# Patient Record
Sex: MALE | Race: WHITE | NOT HISPANIC OR LATINO | Employment: FULL TIME | URBAN - NONMETROPOLITAN AREA
[De-identification: names, ages, dates, MRNs, and addresses within clinical notes are randomized per-mention and may not be internally consistent; named-entity substitution may affect disease eponyms.]

---

## 2017-02-08 ENCOUNTER — HOSPITAL ENCOUNTER (EMERGENCY)
Facility: HOSPITAL | Age: 22
Discharge: HOME | End: 2017-02-08
Attending: FAMILY MEDICINE

## 2017-02-08 VITALS
HEART RATE: 83 BPM | WEIGHT: 188 LBS | OXYGEN SATURATION: 100 % | TEMPERATURE: 97.6 F | RESPIRATION RATE: 18 BRPM | BODY MASS INDEX: 25.47 KG/M2 | SYSTOLIC BLOOD PRESSURE: 124 MMHG | DIASTOLIC BLOOD PRESSURE: 76 MMHG | HEIGHT: 72 IN

## 2017-02-08 DIAGNOSIS — F43.21 ADJUSTMENT DISORDER WITH DEPRESSED MOOD: Primary | ICD-10-CM

## 2017-02-08 LAB
AMPHET+METHAMPHET UR QL: POSITIVE
BARBITURATES UR QL SCN: NEGATIVE
BENZODIAZ UR QL SCN: NEGATIVE
BILIRUB UR QL STRIP: NEGATIVE
CANNABINOIDS SERPL QL: NEGATIVE
CLARITY UR: CLEAR
COCAINE UR QL: NEGATIVE
COLOR UR: YELLOW
ETHANOL BLD-MCNC: <10 MG/DL (ref 0–0)
ETHANOL UR QL: <0.01 %
GLUCOSE UR STRIP-MCNC: NEGATIVE MG/DL
HGB UR QL STRIP.AUTO: NEGATIVE
HOLD SPECIMEN: NORMAL
HOLD SPECIMEN: NORMAL
KETONES UR QL STRIP: NEGATIVE
LEUKOCYTE ESTERASE UR QL STRIP.AUTO: NEGATIVE
METHADONE UR QL SCN: NEGATIVE
NITRITE UR QL STRIP: NEGATIVE
OPIATES UR QL: NEGATIVE
OXYCODONE UR QL SCN: NEGATIVE
PH UR STRIP.AUTO: <=5 [PH] (ref 5–9)
PROT UR QL STRIP: NEGATIVE
SP GR UR STRIP: >1.035 (ref 1–1.03)
UROBILINOGEN UR QL STRIP: ABNORMAL
WHOLE BLOOD HOLD SPECIMEN: NORMAL
WHOLE BLOOD HOLD SPECIMEN: NORMAL

## 2017-02-08 PROCEDURE — 80307 DRUG TEST PRSMV CHEM ANLYZR: CPT | Performed by: FAMILY MEDICINE

## 2017-02-08 PROCEDURE — 81003 URINALYSIS AUTO W/O SCOPE: CPT | Performed by: FAMILY MEDICINE

## 2017-02-08 PROCEDURE — 99283 EMERGENCY DEPT VISIT LOW MDM: CPT

## 2017-02-08 NOTE — ED PROVIDER NOTES
Subjective   HPI Comments: Pt presents for psych evaluation as he made comments to end his life to his girlfriend who called police and made him come here.   Pt denies any suicidal thoughts and says that comment was made out of stressful routine of life.     Patient is a 21 y.o. male presenting with mental health disorder.   History provided by:  Patient  Mental Health Problem   Presenting symptoms: depression and suicidal threats    Patient accompanied by:  Caregiver  Degree of incapacity: none   Onset quality:  Sudden  Timing:  Constant  Progression:  Worsening  Chronicity:  New  Context: not alcohol use, not drug abuse, not medication, not noncompliant and not recent medication change    Treatment compliance:  All of the time  Relieved by:  Nothing  Ineffective treatments:  None tried  Associated symptoms: fatigue    Associated symptoms: no abdominal pain, no anhedonia, no anxiety, no appetite change, no chest pain, no decreased need for sleep, not distractible, no euphoric mood, no feelings of worthlessness, no headaches, no hypersomnia, no hyperventilation, no insomnia, no irritability and no poor judgment        Review of Systems   Constitutional: Positive for fatigue. Negative for appetite change and irritability.   Cardiovascular: Negative for chest pain.   Gastrointestinal: Negative for abdominal pain.   Neurological: Negative for headaches.   Psychiatric/Behavioral: The patient is not nervous/anxious and does not have insomnia.        Past Medical History   Diagnosis Date   • Attention deficit hyperactivity disorder    • Elevated blood pressure reading      without diagnosis of hypertension   • Encounter for screening for infections with a predominantly sexual mode of transmission    • Fatigue    • Generalized anxiety disorder    • Sinus headache    • Sore throat        Allergies   Allergen Reactions   • Biaxin [Clarithromycin]    • Cefzil [Cefprozil]    • Penicillins        History reviewed. No pertinent  "past surgical history.    History reviewed. No pertinent family history.    Social History     Social History   • Marital status: Single     Spouse name: N/A   • Number of children: N/A   • Years of education: N/A     Social History Main Topics   • Smoking status: Never Smoker   • Smokeless tobacco: Never Used   • Alcohol use No   • Drug use: None   • Sexual activity: Not Asked     Other Topics Concern   • None     Social History Narrative           Objective    Visit Vitals   • /76 (Patient Position: Lying)   • Pulse 83   • Temp 97.6 °F (36.4 °C)   • Resp 18   • Ht 72\" (182.9 cm)   • Wt 188 lb (85.3 kg)   • SpO2 100%   • BMI 25.5 kg/m2         Physical Exam   Constitutional: He is oriented to person, place, and time. He appears well-developed and well-nourished.   HENT:   Head: Normocephalic and atraumatic.   Right Ear: External ear normal.   Left Ear: External ear normal.   Nose: Nose normal.   Mouth/Throat: Oropharynx is clear and moist.   Eyes: Conjunctivae and EOM are normal. Pupils are equal, round, and reactive to light.   Neck: Normal range of motion. Neck supple.   Cardiovascular: Normal rate, regular rhythm, normal heart sounds and intact distal pulses.    Pulmonary/Chest: Effort normal and breath sounds normal. No accessory muscle usage. No respiratory distress. He exhibits no tenderness and no deformity.   Abdominal: Soft. Bowel sounds are normal. There is no tenderness.   Musculoskeletal: Normal range of motion.   Neurological: He is alert and oriented to person, place, and time. He has normal reflexes.   Skin: Skin is warm.   Psychiatric: He has a normal mood and affect. His behavior is normal. Judgment and thought content normal.   Nursing note and vitals reviewed.      Procedures         ED Course  ED Course      Labs Reviewed   ETHANOL - Abnormal; Notable for the following:        Result Value    Ethanol <10 (*)     All other components within normal limits   URINE DRUG SCREEN - Abnormal; " Notable for the following:     Amphetamine Screen, Urine Positive (*)     All other components within normal limits    Narrative:     Negative Thresholds For Drugs Screened in Urine:     Amphetamines          500 ng/ml  Barbiturates          200 ng/ml  Benzodiazepines       200 ng/ml  Cocaine               150 ng/ml  Methadone             300 ng/mL  Opiates               300 ng/mL  Oxycodone             100 ng/mL  THC                   20 ng/mL    The normal value for all drugs tested is negative. This report includes final unconfirmed screening results.  A positive result by this assay can be, at your request, sent to the Reference Lab for confirmation by gas chromatography. Unconfirmed results must not be used for non-medical purposes, such as employment or legal testing. Clinical consideration should be applied to any drug of abuse test result, particularly when unconfirmed results are used.   URINALYSIS W/ CULTURE IF INDICATED - Abnormal; Notable for the following:     Specific Gravity, UA >1.035 (*)     All other components within normal limits    Narrative:     Urine microscopic not indicated.   RAINBOW DRAW    Narrative:     The following orders were created for panel order [facility] Draw.  Procedure                               Abnormality         Status                     ---------                               -----------         ------                     Light Blue Top[[id number]]                                    Final result               Green Top (Gel)[[id number]]                                   Final result               Lavender Top[[id number]]                                      Final result               Gold Top - SST[[id number]]                                    Final result                 Please view results for these tests on the individual orders.   LIGHT BLUE TOP   GREEN TOP   LAVENDER TOP   GOLD TOP - SST     No results found.    Pt is medically cleared and seen and evaluated by [facility] staff .  Who agrees pt is not suicidal and made that comment angrily. Pt has mother bedside and who assures pt's safety .   Pt is explained to see psych facility outpt for his depression for further evaluation .           MDM  Number of Diagnoses or Management Options  Adjustment disorder with depressed mood:       Final diagnoses:   Adjustment disorder with depressed mood            Olivia Sanon MD  02/10/17 5722

## 2017-02-17 ENCOUNTER — HOSPITAL ENCOUNTER (OUTPATIENT)
Facility: HOSPITAL | Age: 22
Discharge: HOME | End: 2017-02-18
Attending: EMERGENCY MEDICINE | Admitting: SURGERY

## 2017-02-17 ENCOUNTER — APPOINTMENT (OUTPATIENT)
Dept: CT IMAGING | Facility: HOSPITAL | Age: 22
End: 2017-02-17

## 2017-02-17 ENCOUNTER — ANESTHESIA (OUTPATIENT)
Dept: PERIOP | Facility: HOSPITAL | Age: 22
End: 2017-02-17

## 2017-02-17 ENCOUNTER — TELEPHONE (OUTPATIENT)
Dept: FAMILY MEDICINE CLINIC | Facility: CLINIC | Age: 22
End: 2017-02-17

## 2017-02-17 ENCOUNTER — ANESTHESIA EVENT (OUTPATIENT)
Dept: PERIOP | Facility: HOSPITAL | Age: 22
End: 2017-02-17

## 2017-02-17 DIAGNOSIS — K35.80 ACUTE APPENDICITIS, UNSPECIFIED ACUTE APPENDICITIS TYPE: Primary | ICD-10-CM

## 2017-02-17 PROBLEM — K37 APPENDICITIS: Status: ACTIVE | Noted: 2017-02-17

## 2017-02-17 LAB
ALBUMIN SERPL-MCNC: 4.1 G/DL (ref 3.4–4.8)
ALBUMIN/GLOB SERPL: 1.4 G/DL (ref 1.1–1.8)
ALP SERPL-CCNC: 40 U/L (ref 65–260)
ALT SERPL W P-5'-P-CCNC: 25 U/L (ref 21–72)
ANION GAP SERPL CALCULATED.3IONS-SCNC: 11 MMOL/L (ref 5–15)
AST SERPL-CCNC: 27 U/L (ref 17–59)
BASOPHILS # BLD AUTO: 0.01 10*3/MM3 (ref 0–0.2)
BASOPHILS NFR BLD AUTO: 0.1 % (ref 0–2)
BILIRUB SERPL-MCNC: 0.7 MG/DL (ref 0.2–1.3)
BILIRUB UR QL STRIP: NEGATIVE
BUN BLD-MCNC: 11 MG/DL (ref 7–21)
BUN/CREAT SERPL: 13.9 (ref 7–25)
CALCIUM SPEC-SCNC: 9.2 MG/DL (ref 8.4–10.2)
CHLORIDE SERPL-SCNC: 101 MMOL/L (ref 95–110)
CLARITY UR: CLEAR
CO2 SERPL-SCNC: 30 MMOL/L (ref 22–31)
COLOR UR: YELLOW
CREAT BLD-MCNC: 0.79 MG/DL (ref 0.7–1.3)
DEPRECATED RDW RBC AUTO: 43.3 FL (ref 35.1–43.9)
EOSINOPHIL # BLD AUTO: 0.12 10*3/MM3 (ref 0–0.7)
EOSINOPHIL NFR BLD AUTO: 1.3 % (ref 0–7)
ERYTHROCYTE [DISTWIDTH] IN BLOOD BY AUTOMATED COUNT: 14.1 % (ref 11.5–14.5)
GFR SERPL CREATININE-BSD FRML MDRD: 126 ML/MIN/1.73 (ref 77–179)
GLOBULIN UR ELPH-MCNC: 2.9 GM/DL (ref 2.3–3.5)
GLUCOSE BLD-MCNC: 91 MG/DL (ref 60–100)
GLUCOSE BLDC GLUCOMTR-MCNC: 230 MG/DL (ref 70–130)
GLUCOSE UR STRIP-MCNC: NEGATIVE MG/DL
HCT VFR BLD AUTO: 41.8 % (ref 39–49)
HGB BLD-MCNC: 14.1 G/DL (ref 13.7–17.3)
HGB UR QL STRIP.AUTO: NEGATIVE
HOLD SPECIMEN: NORMAL
HOLD SPECIMEN: NORMAL
IMM GRANULOCYTES # BLD: 0.01 10*3/MM3 (ref 0–0.02)
IMM GRANULOCYTES NFR BLD: 0.1 % (ref 0–0.5)
KETONES UR QL STRIP: NEGATIVE
LEUKOCYTE ESTERASE UR QL STRIP.AUTO: NEGATIVE
LIPASE SERPL-CCNC: 63 U/L (ref 25–110)
LYMPHOCYTES # BLD AUTO: 2.54 10*3/MM3 (ref 0.6–4.2)
LYMPHOCYTES NFR BLD AUTO: 27.5 % (ref 10–50)
MCH RBC QN AUTO: 28.4 PG (ref 26.5–34)
MCHC RBC AUTO-ENTMCNC: 33.7 G/DL (ref 31.5–36.3)
MCV RBC AUTO: 84.1 FL (ref 80–98)
MONOCYTES # BLD AUTO: 0.78 10*3/MM3 (ref 0–0.9)
MONOCYTES NFR BLD AUTO: 8.4 % (ref 0–12)
NEUTROPHILS # BLD AUTO: 5.78 10*3/MM3 (ref 2–8.6)
NEUTROPHILS NFR BLD AUTO: 62.6 % (ref 37–80)
NITRITE UR QL STRIP: NEGATIVE
PH UR STRIP.AUTO: 5.5 [PH] (ref 5–9)
PLATELET # BLD AUTO: 240 10*3/MM3 (ref 150–450)
PMV BLD AUTO: 9.4 FL (ref 8–12)
POTASSIUM BLD-SCNC: 3.8 MMOL/L (ref 3.5–5.1)
PROT SERPL-MCNC: 7 G/DL (ref 6.3–8.6)
PROT UR QL STRIP: NEGATIVE
RBC # BLD AUTO: 4.97 10*6/MM3 (ref 4.37–5.74)
SODIUM BLD-SCNC: 142 MMOL/L (ref 137–145)
SP GR UR STRIP: 1.03 (ref 1–1.03)
UROBILINOGEN UR QL STRIP: NORMAL
WBC NRBC COR # BLD: 9.24 10*3/MM3 (ref 3.2–9.8)
WHOLE BLOOD HOLD SPECIMEN: NORMAL
WHOLE BLOOD HOLD SPECIMEN: NORMAL

## 2017-02-17 PROCEDURE — 99220 PR INITIAL OBSERVATION CARE/DAY 70 MINUTES: CPT | Performed by: SURGERY

## 2017-02-17 PROCEDURE — 83690 ASSAY OF LIPASE: CPT | Performed by: NURSE PRACTITIONER

## 2017-02-17 PROCEDURE — 74176 CT ABD & PELVIS W/O CONTRAST: CPT

## 2017-02-17 PROCEDURE — 96374 THER/PROPH/DIAG INJ IV PUSH: CPT

## 2017-02-17 PROCEDURE — 88302 TISSUE EXAM BY PATHOLOGIST: CPT | Performed by: SURGERY

## 2017-02-17 PROCEDURE — 94760 N-INVAS EAR/PLS OXIMETRY 1: CPT

## 2017-02-17 PROCEDURE — 81003 URINALYSIS AUTO W/O SCOPE: CPT | Performed by: NURSE PRACTITIONER

## 2017-02-17 PROCEDURE — 85025 COMPLETE CBC W/AUTO DIFF WBC: CPT | Performed by: NURSE PRACTITIONER

## 2017-02-17 PROCEDURE — 25010000002 METOCLOPRAMIDE PER 10 MG: Performed by: NURSE ANESTHETIST, CERTIFIED REGISTERED

## 2017-02-17 PROCEDURE — 25010000002 KETOROLAC TROMETHAMINE PER 15 MG: Performed by: NURSE PRACTITIONER

## 2017-02-17 PROCEDURE — 96375 TX/PRO/DX INJ NEW DRUG ADDON: CPT

## 2017-02-17 PROCEDURE — 25010000002 MIDAZOLAM PER 1 MG: Performed by: NURSE ANESTHETIST, CERTIFIED REGISTERED

## 2017-02-17 PROCEDURE — 25010000002 MEPERIDINE 25 MG/0.5ML SOLUTION: Performed by: NURSE ANESTHETIST, CERTIFIED REGISTERED

## 2017-02-17 PROCEDURE — 25010000002 ONDANSETRON PER 1 MG: Performed by: NURSE PRACTITIONER

## 2017-02-17 PROCEDURE — 25010000002 LEVOFLOXACIN PER 250 MG: Performed by: SURGERY

## 2017-02-17 PROCEDURE — 80053 COMPREHEN METABOLIC PANEL: CPT | Performed by: NURSE PRACTITIONER

## 2017-02-17 PROCEDURE — 94799 UNLISTED PULMONARY SVC/PX: CPT

## 2017-02-17 PROCEDURE — 25010000002 FENTANYL CITRATE (PF) 100 MCG/2ML SOLUTION: Performed by: NURSE ANESTHETIST, CERTIFIED REGISTERED

## 2017-02-17 PROCEDURE — 25010000002 PROPOFOL 10 MG/ML EMULSION: Performed by: NURSE ANESTHETIST, CERTIFIED REGISTERED

## 2017-02-17 PROCEDURE — 88302 TISSUE EXAM BY PATHOLOGIST: CPT | Performed by: PATHOLOGY

## 2017-02-17 PROCEDURE — 82962 GLUCOSE BLOOD TEST: CPT

## 2017-02-17 PROCEDURE — 25010000002 NEOSTIGMINE 10 MG/10ML SOLUTION

## 2017-02-17 PROCEDURE — 99284 EMERGENCY DEPT VISIT MOD MDM: CPT

## 2017-02-17 PROCEDURE — 44970 LAPAROSCOPY APPENDECTOMY: CPT | Performed by: SURGERY

## 2017-02-17 PROCEDURE — 25010000002 NEOSTIGMINE PER 0.5 MG: Performed by: NURSE ANESTHETIST, CERTIFIED REGISTERED

## 2017-02-17 RX ORDER — PROMETHAZINE HYDROCHLORIDE 25 MG/ML
12.5 INJECTION, SOLUTION INTRAMUSCULAR; INTRAVENOUS EVERY 4 HOURS PRN
Status: DISCONTINUED | OUTPATIENT
Start: 2017-02-17 | End: 2017-02-18 | Stop reason: HOSPADM

## 2017-02-17 RX ORDER — ACETAMINOPHEN 325 MG/1
650 TABLET ORAL ONCE AS NEEDED
Status: DISCONTINUED | OUTPATIENT
Start: 2017-02-17 | End: 2017-02-17 | Stop reason: HOSPADM

## 2017-02-17 RX ORDER — MORPHINE SULFATE 2 MG/ML
2 INJECTION, SOLUTION INTRAMUSCULAR; INTRAVENOUS
Status: DISCONTINUED | OUTPATIENT
Start: 2017-02-17 | End: 2017-02-17 | Stop reason: HOSPADM

## 2017-02-17 RX ORDER — NALOXONE HCL 0.4 MG/ML
0.1 VIAL (ML) INJECTION
Status: DISCONTINUED | OUTPATIENT
Start: 2017-02-17 | End: 2017-02-18 | Stop reason: HOSPADM

## 2017-02-17 RX ORDER — SODIUM CHLORIDE 9 MG/ML
125 INJECTION, SOLUTION INTRAVENOUS CONTINUOUS
Status: DISCONTINUED | OUTPATIENT
Start: 2017-02-17 | End: 2017-02-18 | Stop reason: HOSPADM

## 2017-02-17 RX ORDER — LEVOFLOXACIN 5 MG/ML
500 INJECTION, SOLUTION INTRAVENOUS EVERY 24 HOURS
Status: DISCONTINUED | OUTPATIENT
Start: 2017-02-18 | End: 2017-02-18 | Stop reason: HOSPADM

## 2017-02-17 RX ORDER — METOCLOPRAMIDE HYDROCHLORIDE 5 MG/ML
INJECTION INTRAMUSCULAR; INTRAVENOUS AS NEEDED
Status: DISCONTINUED | OUTPATIENT
Start: 2017-02-17 | End: 2017-02-17 | Stop reason: SURG

## 2017-02-17 RX ORDER — ONDANSETRON 2 MG/ML
4 INJECTION INTRAMUSCULAR; INTRAVENOUS EVERY 6 HOURS PRN
Status: DISCONTINUED | OUTPATIENT
Start: 2017-02-17 | End: 2017-02-18 | Stop reason: HOSPADM

## 2017-02-17 RX ORDER — ACETAMINOPHEN 650 MG/1
650 SUPPOSITORY RECTAL ONCE AS NEEDED
Status: DISCONTINUED | OUTPATIENT
Start: 2017-02-17 | End: 2017-02-17 | Stop reason: HOSPADM

## 2017-02-17 RX ORDER — DIPHENHYDRAMINE HYDROCHLORIDE 50 MG/ML
12.5 INJECTION INTRAMUSCULAR; INTRAVENOUS
Status: DISCONTINUED | OUTPATIENT
Start: 2017-02-17 | End: 2017-02-17 | Stop reason: HOSPADM

## 2017-02-17 RX ORDER — PROPOFOL 10 MG/ML
VIAL (ML) INTRAVENOUS AS NEEDED
Status: DISCONTINUED | OUTPATIENT
Start: 2017-02-17 | End: 2017-02-17 | Stop reason: SURG

## 2017-02-17 RX ORDER — SODIUM CHLORIDE, SODIUM GLUCONATE, SODIUM ACETATE, POTASSIUM CHLORIDE, AND MAGNESIUM CHLORIDE 526; 502; 368; 37; 30 MG/100ML; MG/100ML; MG/100ML; MG/100ML; MG/100ML
INJECTION, SOLUTION INTRAVENOUS CONTINUOUS PRN
Status: DISCONTINUED | OUTPATIENT
Start: 2017-02-17 | End: 2017-02-17 | Stop reason: SURG

## 2017-02-17 RX ORDER — PROMETHAZINE HYDROCHLORIDE 25 MG/ML
12.5 INJECTION, SOLUTION INTRAMUSCULAR; INTRAVENOUS EVERY 6 HOURS PRN
Status: DISCONTINUED | OUTPATIENT
Start: 2017-02-17 | End: 2017-02-18 | Stop reason: HOSPADM

## 2017-02-17 RX ORDER — DOCUSATE SODIUM 100 MG/1
100 CAPSULE, LIQUID FILLED ORAL 2 TIMES DAILY PRN
Status: DISCONTINUED | OUTPATIENT
Start: 2017-02-17 | End: 2017-02-18 | Stop reason: HOSPADM

## 2017-02-17 RX ORDER — LIDOCAINE HYDROCHLORIDE 20 MG/ML
INJECTION, SOLUTION INFILTRATION; PERINEURAL AS NEEDED
Status: DISCONTINUED | OUTPATIENT
Start: 2017-02-17 | End: 2017-02-17 | Stop reason: SURG

## 2017-02-17 RX ORDER — FLUMAZENIL 0.1 MG/ML
0.2 INJECTION INTRAVENOUS AS NEEDED
Status: DISCONTINUED | OUTPATIENT
Start: 2017-02-17 | End: 2017-02-17 | Stop reason: HOSPADM

## 2017-02-17 RX ORDER — DEXAMETHASONE SODIUM PHOSPHATE 4 MG/ML
8 INJECTION, SOLUTION INTRA-ARTICULAR; INTRALESIONAL; INTRAMUSCULAR; INTRAVENOUS; SOFT TISSUE ONCE AS NEEDED
Status: DISCONTINUED | OUTPATIENT
Start: 2017-02-17 | End: 2017-02-17 | Stop reason: HOSPADM

## 2017-02-17 RX ORDER — ROCURONIUM BROMIDE 10 MG/ML
INJECTION, SOLUTION INTRAVENOUS AS NEEDED
Status: DISCONTINUED | OUTPATIENT
Start: 2017-02-17 | End: 2017-02-17 | Stop reason: SURG

## 2017-02-17 RX ORDER — ONDANSETRON 4 MG/1
4 TABLET, ORALLY DISINTEGRATING ORAL EVERY 6 HOURS PRN
Status: DISCONTINUED | OUTPATIENT
Start: 2017-02-17 | End: 2017-02-18 | Stop reason: HOSPADM

## 2017-02-17 RX ORDER — ONDANSETRON 2 MG/ML
4 INJECTION INTRAMUSCULAR; INTRAVENOUS ONCE AS NEEDED
Status: DISCONTINUED | OUTPATIENT
Start: 2017-02-17 | End: 2017-02-17 | Stop reason: HOSPADM

## 2017-02-17 RX ORDER — GLYCOPYRROLATE 0.2 MG/ML
INJECTION INTRAMUSCULAR; INTRAVENOUS AS NEEDED
Status: DISCONTINUED | OUTPATIENT
Start: 2017-02-17 | End: 2017-02-17 | Stop reason: SURG

## 2017-02-17 RX ORDER — ONDANSETRON 4 MG/1
4 TABLET, FILM COATED ORAL EVERY 6 HOURS PRN
Status: DISCONTINUED | OUTPATIENT
Start: 2017-02-17 | End: 2017-02-18 | Stop reason: HOSPADM

## 2017-02-17 RX ORDER — LEVOFLOXACIN 5 MG/ML
500 INJECTION, SOLUTION INTRAVENOUS
Status: COMPLETED | OUTPATIENT
Start: 2017-02-17 | End: 2017-02-17

## 2017-02-17 RX ORDER — SODIUM CHLORIDE, SODIUM GLUCONATE, SODIUM ACETATE, POTASSIUM CHLORIDE, AND MAGNESIUM CHLORIDE 526; 502; 368; 37; 30 MG/100ML; MG/100ML; MG/100ML; MG/100ML; MG/100ML
100 INJECTION, SOLUTION INTRAVENOUS CONTINUOUS
Status: DISCONTINUED | OUTPATIENT
Start: 2017-02-17 | End: 2017-02-18 | Stop reason: HOSPADM

## 2017-02-17 RX ORDER — BUPIVACAINE HYDROCHLORIDE AND EPINEPHRINE 2.5; 5 MG/ML; UG/ML
INJECTION, SOLUTION EPIDURAL; INFILTRATION; INTRACAUDAL; PERINEURAL
Status: DISPENSED
Start: 2017-02-17 | End: 2017-02-18

## 2017-02-17 RX ORDER — HYDRALAZINE HYDROCHLORIDE 20 MG/ML
5 INJECTION INTRAMUSCULAR; INTRAVENOUS
Status: DISCONTINUED | OUTPATIENT
Start: 2017-02-17 | End: 2017-02-17 | Stop reason: HOSPADM

## 2017-02-17 RX ORDER — METOCLOPRAMIDE HYDROCHLORIDE 5 MG/ML
10 INJECTION INTRAMUSCULAR; INTRAVENOUS ONCE AS NEEDED
Status: DISCONTINUED | OUTPATIENT
Start: 2017-02-17 | End: 2017-02-17 | Stop reason: HOSPADM

## 2017-02-17 RX ORDER — CETIRIZINE HYDROCHLORIDE 5 MG/1
5 TABLET ORAL DAILY
Status: DISCONTINUED | OUTPATIENT
Start: 2017-02-17 | End: 2017-02-18 | Stop reason: HOSPADM

## 2017-02-17 RX ORDER — HYDROCODONE BITARTRATE AND ACETAMINOPHEN 5; 325 MG/1; MG/1
1 TABLET ORAL EVERY 4 HOURS PRN
Status: DISCONTINUED | OUTPATIENT
Start: 2017-02-17 | End: 2017-02-18 | Stop reason: HOSPADM

## 2017-02-17 RX ORDER — FENTANYL CITRATE 50 UG/ML
INJECTION, SOLUTION INTRAMUSCULAR; INTRAVENOUS AS NEEDED
Status: DISCONTINUED | OUTPATIENT
Start: 2017-02-17 | End: 2017-02-17 | Stop reason: SURG

## 2017-02-17 RX ORDER — MIDAZOLAM HYDROCHLORIDE 1 MG/ML
INJECTION INTRAMUSCULAR; INTRAVENOUS AS NEEDED
Status: DISCONTINUED | OUTPATIENT
Start: 2017-02-17 | End: 2017-02-17 | Stop reason: SURG

## 2017-02-17 RX ORDER — ONDANSETRON 2 MG/ML
4 INJECTION INTRAMUSCULAR; INTRAVENOUS ONCE
Status: COMPLETED | OUTPATIENT
Start: 2017-02-17 | End: 2017-02-17

## 2017-02-17 RX ORDER — SODIUM CHLORIDE 0.9 % (FLUSH) 0.9 %
10 SYRINGE (ML) INJECTION AS NEEDED
Status: DISCONTINUED | OUTPATIENT
Start: 2017-02-17 | End: 2017-02-17 | Stop reason: HOSPADM

## 2017-02-17 RX ORDER — BUPIVACAINE HYDROCHLORIDE AND EPINEPHRINE 5; 5 MG/ML; UG/ML
INJECTION, SOLUTION EPIDURAL; INTRACAUDAL; PERINEURAL AS NEEDED
Status: DISCONTINUED | OUTPATIENT
Start: 2017-02-17 | End: 2017-02-18 | Stop reason: HOSPADM

## 2017-02-17 RX ORDER — LABETALOL HYDROCHLORIDE 5 MG/ML
5 INJECTION, SOLUTION INTRAVENOUS
Status: DISCONTINUED | OUTPATIENT
Start: 2017-02-17 | End: 2017-02-17 | Stop reason: HOSPADM

## 2017-02-17 RX ORDER — KETOROLAC TROMETHAMINE 30 MG/ML
30 INJECTION, SOLUTION INTRAMUSCULAR; INTRAVENOUS ONCE
Status: COMPLETED | OUTPATIENT
Start: 2017-02-17 | End: 2017-02-17

## 2017-02-17 RX ORDER — NALOXONE HCL 0.4 MG/ML
0.2 VIAL (ML) INJECTION AS NEEDED
Status: DISCONTINUED | OUTPATIENT
Start: 2017-02-17 | End: 2017-02-17 | Stop reason: HOSPADM

## 2017-02-17 RX ORDER — ACETAMINOPHEN 325 MG/1
650 TABLET ORAL EVERY 4 HOURS PRN
Status: DISCONTINUED | OUTPATIENT
Start: 2017-02-17 | End: 2017-02-18 | Stop reason: HOSPADM

## 2017-02-17 RX ADMIN — FENTANYL CITRATE 50 MCG: 50 INJECTION, SOLUTION INTRAMUSCULAR; INTRAVENOUS at 13:19

## 2017-02-17 RX ADMIN — METRONIDAZOLE 500 MG: 500 INJECTION, SOLUTION INTRAVENOUS at 13:11

## 2017-02-17 RX ADMIN — FENTANYL CITRATE 50 MCG: 50 INJECTION, SOLUTION INTRAMUSCULAR; INTRAVENOUS at 12:55

## 2017-02-17 RX ADMIN — FENTANYL CITRATE 100 MCG: 50 INJECTION, SOLUTION INTRAMUSCULAR; INTRAVENOUS at 13:05

## 2017-02-17 RX ADMIN — SODIUM CHLORIDE, SODIUM GLUCONATE, SODIUM ACETATE, POTASSIUM CHLORIDE, AND MAGNESIUM CHLORIDE 100 ML/HR: 526; 502; 368; 37; 30 INJECTION, SOLUTION INTRAVENOUS at 14:20

## 2017-02-17 RX ADMIN — MIDAZOLAM 2 MG: 1 INJECTION INTRAMUSCULAR; INTRAVENOUS at 12:48

## 2017-02-17 RX ADMIN — LIDOCAINE HYDROCHLORIDE 60 MG: 20 INJECTION, SOLUTION INFILTRATION; PERINEURAL at 12:55

## 2017-02-17 RX ADMIN — NEOSTIGMINE METHYLSULFATE 3 MG: 1 INJECTION, SOLUTION INTRAMUSCULAR; INTRAVENOUS; SUBCUTANEOUS at 13:39

## 2017-02-17 RX ADMIN — MEPERIDINE HYDROCHLORIDE 12.5 MG: 50 INJECTION, SOLUTION INTRAMUSCULAR; INTRAVENOUS; SUBCUTANEOUS at 13:57

## 2017-02-17 RX ADMIN — SODIUM CHLORIDE, SODIUM GLUCONATE, SODIUM ACETATE, POTASSIUM CHLORIDE, AND MAGNESIUM CHLORIDE: 526; 502; 368; 37; 30 INJECTION, SOLUTION INTRAVENOUS at 12:48

## 2017-02-17 RX ADMIN — MEPERIDINE HYDROCHLORIDE 12.5 MG: 50 INJECTION, SOLUTION INTRAMUSCULAR; INTRAVENOUS; SUBCUTANEOUS at 14:23

## 2017-02-17 RX ADMIN — ONDANSETRON 4 MG: 2 INJECTION INTRAMUSCULAR; INTRAVENOUS at 11:05

## 2017-02-17 RX ADMIN — ROCURONIUM BROMIDE 25 MG: 10 INJECTION INTRAVENOUS at 13:16

## 2017-02-17 RX ADMIN — LEVOFLOXACIN 500 MG: 5 INJECTION, SOLUTION INTRAVENOUS at 13:14

## 2017-02-17 RX ADMIN — KETOROLAC TROMETHAMINE 30 MG: 30 INJECTION, SOLUTION INTRAMUSCULAR; INTRAVENOUS at 11:06

## 2017-02-17 RX ADMIN — ROCURONIUM BROMIDE 5 MG: 10 INJECTION INTRAVENOUS at 12:49

## 2017-02-17 RX ADMIN — PROPOFOL 120 MG: 10 INJECTION, EMULSION INTRAVENOUS at 12:55

## 2017-02-17 RX ADMIN — METOCLOPRAMIDE 10 MG: 5 INJECTION, SOLUTION INTRAMUSCULAR; INTRAVENOUS at 12:46

## 2017-02-17 RX ADMIN — SODIUM CHLORIDE 125 ML/HR: 900 INJECTION, SOLUTION INTRAVENOUS at 16:57

## 2017-02-17 RX ADMIN — GLYCOPYRROLATE 0.6 MG: 0.2 INJECTION, SOLUTION INTRAMUSCULAR; INTRAVENOUS at 13:39

## 2017-02-17 RX ADMIN — Medication 10 ML: at 11:06

## 2017-02-17 RX ADMIN — CETIRIZINE HYDROCHLORIDE 5 MG: 5 TABLET, FILM COATED ORAL at 17:44

## 2017-02-17 RX ADMIN — HYDROCODONE BITARTRATE AND ACETAMINOPHEN 1 TABLET: 5; 325 TABLET ORAL at 17:07

## 2017-02-17 RX ADMIN — METRONIDAZOLE 500 MG: 500 INJECTION, SOLUTION INTRAVENOUS at 17:45

## 2017-02-17 RX ADMIN — SODIUM CITRATE AND CITRIC ACID MONOHYDRATE 30 ML: 500; 334 SOLUTION ORAL at 12:46

## 2017-02-17 NOTE — ED PROVIDER NOTES
Subjective   Patient is a 21 y.o. male presenting with abdominal pain.   History provided by:  Patient  Abdominal Pain   Pain location:  RLQ  Pain quality: aching    Pain radiates to:  R flank  Pain severity:  Moderate  Onset quality:  Sudden  Progression:  Waxing and waning  Chronicity:  New      Review of Systems   Constitutional: Negative.    Eyes: Negative.    Respiratory: Negative.    Cardiovascular: Negative.    Gastrointestinal: Positive for abdominal pain.   Genitourinary: Positive for flank pain. Negative for difficulty urinating.   Skin: Negative.    Neurological: Negative.    Psychiatric/Behavioral: Negative.        Past Medical History   Diagnosis Date   • Attention deficit hyperactivity disorder    • Elevated blood pressure reading      without diagnosis of hypertension   • Encounter for screening for infections with a predominantly sexual mode of transmission    • Fatigue    • Generalized anxiety disorder    • Sinus headache    • Sore throat        Allergies   Allergen Reactions   • Biaxin [Clarithromycin]    • Cefzil [Cefprozil]    • Penicillins        Past Surgical History   Procedure Laterality Date   • External ear surgery         History reviewed. No pertinent family history.    Social History     Social History   • Marital status: Single     Spouse name: N/A   • Number of children: N/A   • Years of education: N/A     Social History Main Topics   • Smoking status: Never Smoker   • Smokeless tobacco: Never Used   • Alcohol use No   • Drug use: No   • Sexual activity: Not Asked     Other Topics Concern   • None     Social History Narrative   • None           Objective   Physical Exam   Constitutional: He is oriented to person, place, and time. He appears well-developed and well-nourished.   HENT:   Head: Normocephalic.   Eyes: EOM are normal. Pupils are equal, round, and reactive to light.   Neck: Normal range of motion. Neck supple.   Cardiovascular: Normal rate.    Pulmonary/Chest: Effort normal  "and breath sounds normal.   Abdominal: Soft. Bowel sounds are normal. He exhibits no distension. There is tenderness. There is rebound.   Tenderness over R CVA   Neurological: He is alert and oriented to person, place, and time.   Skin: Skin is warm and dry.   Nursing note and vitals reviewed.    Visit Vitals   • /65 (BP Location: Right arm, Patient Position: Lying)   • Pulse 88   • Temp 98.2 °F (36.8 °C) (Oral)   • Resp 16   • Ht 72\" (182.9 cm)   • Wt 188 lb (85.3 kg)   • SpO2 99%   • BMI 25.5 kg/m2         Procedures         ED Course  ED Course      Results for orders placed or performed during the hospital encounter of 02/17/17   Comprehensive Metabolic Panel   Result Value Ref Range    Glucose 91 60 - 100 mg/dL    BUN 11 7 - 21 mg/dL    Creatinine 0.79 0.70 - 1.30 mg/dL    Sodium 142 137 - 145 mmol/L    Potassium 3.8 3.5 - 5.1 mmol/L    Chloride 101 95 - 110 mmol/L    CO2 30.0 22.0 - 31.0 mmol/L    Calcium 9.2 8.4 - 10.2 mg/dL    Total Protein 7.0 6.3 - 8.6 g/dL    Albumin 4.10 3.40 - 4.80 g/dL    ALT (SGPT) 25 21 - 72 U/L    AST (SGOT) 27 17 - 59 U/L    Alkaline Phosphatase 40 (L) 65 - 260 U/L    Total Bilirubin 0.7 0.2 - 1.3 mg/dL    eGFR Non  Amer 126 77 - 179 mL/min/1.73    Globulin 2.9 2.3 - 3.5 gm/dL    A/G Ratio 1.4 1.1 - 1.8 g/dL    BUN/Creatinine Ratio 13.9 7.0 - 25.0    Anion Gap 11.0 5.0 - 15.0 mmol/L   Lipase   Result Value Ref Range    Lipase 63 25 - 110 U/L   Urinalysis With / Culture If Indicated   Result Value Ref Range    Color, UA Yellow Yellow, Straw, Dark Yellow, Vale    Appearance, UA Clear Clear    pH, UA 5.5 5.0 - 9.0    Specific Gravity, UA 1.029 1.003 - 1.030    Glucose, UA Negative Negative    Ketones, UA Negative Negative    Bilirubin, UA Negative Negative    Blood, UA Negative Negative    Protein, UA Negative Negative    Leuk Esterase, UA Negative Negative    Nitrite, UA Negative Negative    Urobilinogen, UA 0.2 E.U./dL 0.2 - 1.0 E.U./dL   CBC Auto Differential   Result " Value Ref Range    WBC 9.24 3.20 - 9.80 10*3/mm3    RBC 4.97 4.37 - 5.74 10*6/mm3    Hemoglobin 14.1 13.7 - 17.3 g/dL    Hematocrit 41.8 39.0 - 49.0 %    MCV 84.1 80.0 - 98.0 fL    MCH 28.4 26.5 - 34.0 pg    MCHC 33.7 31.5 - 36.3 g/dL    RDW 14.1 11.5 - 14.5 %    RDW-SD 43.3 35.1 - 43.9 fl    MPV 9.4 8.0 - 12.0 fL    Platelets 240 150 - 450 10*3/mm3    Neutrophil % 62.6 37.0 - 80.0 %    Lymphocyte % 27.5 10.0 - 50.0 %    Monocyte % 8.4 0.0 - 12.0 %    Eosinophil % 1.3 0.0 - 7.0 %    Basophil % 0.1 0.0 - 2.0 %    Immature Grans % 0.1 0.0 - 0.5 %    Neutrophils, Absolute 5.78 2.00 - 8.60 10*3/mm3    Lymphocytes, Absolute 2.54 0.60 - 4.20 10*3/mm3    Monocytes, Absolute 0.78 0.00 - 0.90 10*3/mm3    Eosinophils, Absolute 0.12 0.00 - 0.70 10*3/mm3    Basophils, Absolute 0.01 0.00 - 0.20 10*3/mm3    Immature Grans, Absolute 0.01 0.00 - 0.02 10*3/mm3     CT Abdomen Pelvis Without Contrast   Final Result   CONCLUSION: Dilated appendix with adjacent right lower quadrant   inflammatory changes. These findings are diagnostic for acute   appendicitis. No free air or abscess.       These findings discussed with the Marissa Danielson in the emergency   department at 11:31 AM.      Electronically signed by:  Glenn Javier MD  2/17/2017 11:32 AM CST   Workstation: [id number]                    [facility]  Number of Diagnoses or Management Options  Acute appendicitis, unspecified acute appendicitis type:   Diagnosis management comments: D/W Dr John, he has acute appendicitis. He will be seeing pt in ER. Dr John will be taking him to OR.      Final diagnoses:   Acute appendicitis, unspecified acute appendicitis type            Meron Danielson, APRN  02/17/17 9450

## 2017-02-17 NOTE — PLAN OF CARE
Problem: Patient Care Overview (Adult)  Goal: Plan of Care Review  Outcome: Ongoing (interventions implemented as appropriate)    02/17/17 1528   Coping/Psychosocial Response Interventions   Plan Of Care Reviewed With patient   Patient Care Overview   Progress improving   Outcome Evaluation   Outcome Summary/Follow up Plan vss. no distress noted. denies acute pain. states he feels better. ready for dc from PACU.       Goal: Adult Individualization and Mutuality  Outcome: Ongoing (interventions implemented as appropriate)    Problem: Perioperative Period (Adult)  Goal: Signs and Symptoms of Listed Potential Problems Will be Absent or Manageable (Perioperative Period)  Outcome: Ongoing (interventions implemented as appropriate)

## 2017-02-17 NOTE — ANESTHESIA POSTPROCEDURE EVALUATION
Patient: Sean De Souza    Procedure Summary     Date Anesthesia Start Anesthesia Stop Room / Location    02/17/17 1249 1469 [facility] OR 03 / [facility]       Procedure Diagnosis Surgeon Provider    APPENDECTOMY LAPAROSCOPIC  (N/A Abdomen) Acute appendicitis, unspecified acute appendicitis type  (Acute appendicitis, unspecified acute appendicitis type [K35.80]) MD Miguel Mena MD          Anesthesia Type: general  Last vitals  BP      Temp      Pulse     Resp      SpO2        Post Anesthesia Care and Evaluation    Patient location during evaluation: PACU  Patient participation: complete - patient participated  Level of consciousness: awake and alert  Pain management: adequate  Airway patency: patent  Anesthetic complications: No anesthetic complications    Cardiovascular status: acceptable  Respiratory status: acceptable  Hydration status: acceptable

## 2017-02-17 NOTE — H&P
CHIEF COMPLAINT:    Abdominal pain, RLQ    HISTORY OF PRESENT ILLNESS:    Sean De Souza is a 21 y.o. male who has had one day of dull RLQ abdominal pain which began yesterday morning.  The pain has gotten more intense and sharper.  It is associated with loss of appetite and one episode of N/V.  He denies fevers or chills.  There is no radiation of the pain.    He was evaluated in the ER where labs were essentially normal.  A CT showed a dilated inflamed appendix consistent with acute appendicitis.    Past Medical History   Diagnosis Date   • Attention deficit hyperactivity disorder    • Elevated blood pressure reading      without diagnosis of hypertension   • Encounter for screening for infections with a predominantly sexual mode of transmission    • Fatigue    • Generalized anxiety disorder    • Sinus headache    • Sore throat        Past Surgical History   Procedure Laterality Date   • External ear surgery           Current Facility-Administered Medications:   •  sodium chloride 0.9 % flush 10 mL, 10 mL, Intravenous, PRN, Meron Danielson, APRN, 10 mL at 02/17/17 1106    Current Outpatient Prescriptions:   •  amphetamine-dextroamphetamine (ADDERALL) 20 MG tablet, Take 1 tablet by mouth Daily., Disp: 30 tablet, Rfl: 0  •  amphetamine-dextroamphetamine (ADDERALL) 5 MG tablet, Take 1 tablet by mouth Daily., Disp: 30 tablet, Rfl: 0  •  minocycline (MINOCIN,DYNACIN) 50 MG capsule, Take 50 mg by mouth 2 (Two) Times a Day., Disp: , Rfl:   •  hydrocortisone 2.5 % lotion, Apply  topically 2 (Two) Times a Day. Apply to affected area, Disp: , Rfl:   •  loratadine (CLARITIN) 10 MG tablet, Take 10 mg by mouth Daily., Disp: , Rfl:   •  sertraline (ZOLOFT) 50 MG tablet, TAKE 1/2 TABLET BY MOUTH ONCE DAILY FOR 6 DAYS, THEN 1 TABLET ONCE DAILY THEREAFTER., Disp: , Rfl:     Allergies   Allergen Reactions   • Biaxin [Clarithromycin]    • Cefzil [Cefprozil]    • Penicillins        History reviewed. No pertinent family  "history.    Social History     Social History   • Marital status: Single     Spouse name: N/A   • Number of children: N/A   • Years of education: N/A     Occupational History   • Not on file.     Social History Main Topics   • Smoking status: Never Smoker   • Smokeless tobacco: Never Used   • Alcohol use No   • Drug use: No   • Sexual activity: Not on file     Other Topics Concern   • Not on file     Social History Narrative   • No narrative on file       Review of Systems   Constitutional: Positive for appetite change. Negative for activity change, chills and fever.   HENT: Negative for hearing loss, nosebleeds and trouble swallowing.    Cardiovascular: Negative for chest pain, palpitations and leg swelling.   Gastrointestinal: Positive for abdominal pain, nausea and vomiting. Negative for abdominal distention, anal bleeding, blood in stool, constipation, diarrhea and rectal pain.   Endocrine: Negative for cold intolerance, heat intolerance, polydipsia and polyuria.   Genitourinary: Negative for decreased urine volume, difficulty urinating, dysuria, enuresis, frequency, hematuria and urgency.   Musculoskeletal: Negative for arthralgias, back pain, gait problem, myalgias and neck pain.   Skin: Negative for pallor, rash and wound.   Allergic/Immunologic: Negative for immunocompromised state.   Neurological: Negative for dizziness, seizures, weakness, light-headedness, numbness and headaches.   Psychiatric/Behavioral: Negative for agitation and behavioral problems. The patient is not nervous/anxious.        Objective     Visit Vitals   • /65 (BP Location: Right arm, Patient Position: Lying)   • Pulse 88   • Temp 98.2 °F (36.8 °C) (Oral)   • Resp 16   • Ht 72\" (182.9 cm)   • Wt 188 lb (85.3 kg)   • SpO2 99%   • BMI 25.5 kg/m2       Physical Exam   Constitutional: He is oriented to person, place, and time. He appears well-developed and well-nourished.   HENT:   Head: Normocephalic and atraumatic.   Nose: Nose " normal.   Eyes: Conjunctivae and EOM are normal. Right eye exhibits no discharge. Left eye exhibits no discharge.   Neck: Trachea normal, normal range of motion and phonation normal. Neck supple. No JVD present. No tracheal deviation and no edema present. No thyromegaly present.   Cardiovascular: Normal rate, regular rhythm and normal heart sounds.  Exam reveals no gallop and no friction rub.    No murmur heard.  Pulmonary/Chest: Effort normal and breath sounds normal. No accessory muscle usage. No respiratory distress. He has no decreased breath sounds. He has no wheezes. He has no rales. He exhibits no tenderness.   Abdominal: Soft. He exhibits no distension, no fluid wave, no ascites, no pulsatile midline mass and no mass. There is tenderness. There is rebound and guarding. No hernia.   RLQ pain/tenderness   Musculoskeletal: Normal range of motion. He exhibits no edema, tenderness or deformity.   Lymphadenopathy:     He has no cervical adenopathy.        Left: No supraclavicular adenopathy present.   Neurological: He is alert and oriented to person, place, and time. He has normal strength. No cranial nerve deficit.   Skin: Skin is warm and dry. No rash noted. He is not diaphoretic. No erythema. No pallor.   Psychiatric: He has a normal mood and affect. His speech is normal and behavior is normal. Judgment and thought content normal. Cognition and memory are normal.   Vitals reviewed.      DIAGNOSTIC DATA:    Labs reviewed, essentially normal.  CT reviewed showing dilated appendix.    ASSESSMENT:    Acute Appendicitis    PLAN:    Risks and benefits of laparoscopic appendectomy, possible open discussed with the patient and his mother.  They wish to proceed.  Will start IVF and ABx now.          This document has been electronically signed by Jamil John MD on February 17, 2017 11:57 AM

## 2017-02-17 NOTE — ANESTHESIA PROCEDURE NOTES
Airway  Urgency: elective    Airway not difficult    General Information and Staff    CRNA: AR MENDES    Indications and Patient Condition  Indications for airway management: airway protection    Preoxygenated: yes  Mask difficulty assessment: 0 - not attempted    Final Airway Details  Final airway type: endotracheal airway      Successful airway: ETT  Cuffed: yes   Successful intubation technique: video laryngoscopy  Facilitating devices/methods: cricoid pressure  Endotracheal tube insertion site: oral  Blade: Tracy  Blade size: #3  ETT size: 7.0 mm  Cormack-Lehane Classification: grade I - full view of glottis  Placement verified by: chest auscultation and capnometry   Measured from: lips  ETT to lips (cm): 22  Number of attempts at approach: 1

## 2017-02-17 NOTE — TELEPHONE ENCOUNTER
----- Message from Bridger Bee MD sent at 2/17/2017  1:10 PM CST -----  Regarding: RE: PRESPS?  Contact: [phone]   Needs apt for adhd medication  ----- Message -----     From: Sav Haynes MA     Sent: 2/17/2017   9:03 AM       To: Bridger Bee MD  Subject: FW: PRESPS?                                      Pt's last appt was 10/05/2016. No f/u appt was ever made. No future appts scheduled.      Please advise on ADHD medication.    Thank you  ----- Message -----     From: Dionne Muniz     Sent: 2/17/2017   8:04 AM       To: Sav Haynes MA  Subject: PRESPS?                                          MOTHER OF FRANCISCA JUDD CALLED FIRST WANTING TO GET HIM WORKED IN WITH DR BEE..THINKS HE MAY HAVE THE FLU..TOLD HER THAT WOULD NOT BE POSSIBLE UNLESS WE GOT A CANCELATION..SHE COULD TAKE HIM TO CC...THEN SHE CALLS AGAIN ASKING TO GET HIM IN FOR HIS ADHD REFILLS..SO I TOLD HER WE WOULD SEND A MESSAGE ABOUT REFILLS....PLEASE CALL HER BACK ABOUT PRESPS

## 2017-02-17 NOTE — ANESTHESIA PREPROCEDURE EVALUATION
Anesthesia Evaluation     Patient summary reviewed and Nursing notes reviewed   NPO Status: waived due to emergency   Airway   Mallampati: II  Dental      Pulmonary - negative pulmonary ROS    breath sounds clear to auscultation  Cardiovascular - negative cardio ROS    Rhythm: regular        Neuro/Psych    ROS Comment: ADHD  GI/Hepatic/Renal/Endo      ROS Comment: Acute appendicitis    Musculoskeletal (-) negative ROS    Abdominal    Substance History - negative use     OB/GYN negative ob/gyn ROS         Other                                    Anesthesia Plan    ASA 2 - emergent     general   (Chicken and biscuit @ 0700)  intravenous induction   Anesthetic plan and risks discussed with patient.

## 2017-02-18 VITALS
SYSTOLIC BLOOD PRESSURE: 127 MMHG | RESPIRATION RATE: 19 BRPM | WEIGHT: 188 LBS | OXYGEN SATURATION: 99 % | DIASTOLIC BLOOD PRESSURE: 64 MMHG | HEART RATE: 106 BPM | HEIGHT: 72 IN | TEMPERATURE: 98 F | BODY MASS INDEX: 25.47 KG/M2

## 2017-02-18 PROBLEM — K37 APPENDICITIS: Status: RESOLVED | Noted: 2017-02-17 | Resolved: 2017-02-18

## 2017-02-18 PROBLEM — K35.80 ACUTE APPENDICITIS: Status: RESOLVED | Noted: 2017-02-17 | Resolved: 2017-02-18

## 2017-02-18 PROCEDURE — 94799 UNLISTED PULMONARY SVC/PX: CPT

## 2017-02-18 PROCEDURE — 99024 POSTOP FOLLOW-UP VISIT: CPT | Performed by: SURGERY

## 2017-02-18 PROCEDURE — 51702 INSERT TEMP BLADDER CATH: CPT

## 2017-02-18 PROCEDURE — 25010000002 LEVOFLOXACIN PER 250 MG: Performed by: SURGERY

## 2017-02-18 PROCEDURE — 25010000002 ENOXAPARIN PER 10 MG: Performed by: SURGERY

## 2017-02-18 RX ORDER — TAMSULOSIN HYDROCHLORIDE 0.4 MG/1
0.4 CAPSULE ORAL DAILY
Status: DISCONTINUED | OUTPATIENT
Start: 2017-02-18 | End: 2017-02-18 | Stop reason: HOSPADM

## 2017-02-18 RX ORDER — HYDROCODONE BITARTRATE AND ACETAMINOPHEN 5; 325 MG/1; MG/1
1-2 TABLET ORAL EVERY 4 HOURS PRN
Qty: 30 TABLET | Refills: 0 | Status: SHIPPED | OUTPATIENT
Start: 2017-02-18 | End: 2017-03-27

## 2017-02-18 RX ADMIN — LEVOFLOXACIN 500 MG: 5 INJECTION, SOLUTION INTRAVENOUS at 08:45

## 2017-02-18 RX ADMIN — METRONIDAZOLE 500 MG: 500 INJECTION, SOLUTION INTRAVENOUS at 00:53

## 2017-02-18 RX ADMIN — ENOXAPARIN SODIUM 40 MG: 40 INJECTION SUBCUTANEOUS at 08:44

## 2017-02-18 RX ADMIN — TAMSULOSIN HYDROCHLORIDE 0.4 MG: 0.4 CAPSULE ORAL at 12:02

## 2017-02-18 RX ADMIN — METRONIDAZOLE 500 MG: 500 INJECTION, SOLUTION INTRAVENOUS at 08:44

## 2017-02-18 RX ADMIN — HYDROCODONE BITARTRATE AND ACETAMINOPHEN 1 TABLET: 5; 325 TABLET ORAL at 00:53

## 2017-02-18 RX ADMIN — CETIRIZINE HYDROCHLORIDE 5 MG: 5 TABLET, FILM COATED ORAL at 08:44

## 2017-02-18 NOTE — SIGNIFICANT NOTE
Nurse in room with patient when patient using toilet to urinate. Pt stated he felt dizzy and fell forward, hitting his head on the shower. Vitals taken 102/54, 98, 24 resp, 98 on room air. Neuro checked performed and wdl. Bruising noted to top of head.

## 2017-02-18 NOTE — PLAN OF CARE
Problem: Patient Care Overview (Adult)  Goal: Plan of Care Review  Outcome: Ongoing (interventions implemented as appropriate)    02/18/17 0441   Coping/Psychosocial Response Interventions   Plan Of Care Reviewed With patient   Patient Care Overview   Progress no change   Outcome Evaluation   Outcome Summary/Follow up Plan Pt had fall from toilet 2-17-17; pt c/o dizziness; problems voiding; ate regualr diet for snack, tolerated well.       Goal: Adult Individualization and Mutuality  Outcome: Ongoing (interventions implemented as appropriate)  Goal: Discharge Needs Assessment  Outcome: Ongoing (interventions implemented as appropriate)    Problem: Perioperative Period (Adult)  Goal: Signs and Symptoms of Listed Potential Problems Will be Absent or Manageable (Perioperative Period)  Outcome: Ongoing (interventions implemented as appropriate)    Problem: Fall Risk (Adult)  Goal: Identify Related Risk Factors and Signs and Symptoms  Outcome: Ongoing (interventions implemented as appropriate)  Goal: Absence of Falls  Outcome: Ongoing (interventions implemented as appropriate)

## 2017-02-18 NOTE — PROGRESS NOTES
GENERAL SURGERY PROGRESS NOTE  Chief Complaint:  Surgery Follow up   LOS: 0 days       Subjective     Interval History:     Tolerated diet.  Did fall last night while attempting to use the bathroom, says he feels better now.  Required in and out cath due to inability to urinate.      Objective     Vital Signs  Temp:  [96.2 °F (35.7 °C)-99.3 °F (37.4 °C)] 99.3 °F (37.4 °C)  Heart Rate:  [] 100  Resp:  [16-20] 18  BP: (104-135)/(58-79) 117/67    Physical Exam:   Abdomen soft, incisions CDI  Labs:  Lab Results (last 24 hours)     Procedure Component Value Units Date/Time    CBC & Differential [[id number]] Collected:  02/17/17 1100    Specimen:  Blood Updated:  02/17/17 1117    Narrative:       The following orders were created for panel order CBC & Differential.  Procedure                               Abnormality         Status                     ---------                               -----------         ------                     CBC Auto Differential[[id number]]         Normal              Final result                 Please view results for these tests on the individual orders.    CBC Auto Differential [[id number]]  (Normal) Collected:  02/17/17 1100    Specimen:  Blood Updated:  02/17/17 1117     WBC 9.24 10*3/mm3      RBC 4.97 10*6/mm3      Hemoglobin 14.1 g/dL      Hematocrit 41.8 %      MCV 84.1 fL      MCH 28.4 pg      MCHC 33.7 g/dL      RDW 14.1 %      RDW-SD 43.3 fl      MPV 9.4 fL      Platelets 240 10*3/mm3      Neutrophil % 62.6 %      Lymphocyte % 27.5 %      Monocyte % 8.4 %      Eosinophil % 1.3 %      Basophil % 0.1 %      Immature Grans % 0.1 %      Neutrophils, Absolute 5.78 10*3/mm3      Lymphocytes, Absolute 2.54 10*3/mm3      Monocytes, Absolute 0.78 10*3/mm3      Eosinophils, Absolute 0.12 10*3/mm3      Basophils, Absolute 0.01 10*3/mm3      Immature Grans, Absolute 0.01 10*3/mm3     Comprehensive Metabolic Panel [[id number]]  (Abnormal) Collected:  02/17/17 1100    Specimen:  Blood Updated:   02/17/17 1134     Glucose 91 mg/dL      BUN 11 mg/dL      Creatinine 0.79 mg/dL      Sodium 142 mmol/L      Potassium 3.8 mmol/L      Chloride 101 mmol/L      CO2 30.0 mmol/L      Calcium 9.2 mg/dL      Total Protein 7.0 g/dL      Albumin 4.10 g/dL      ALT (SGPT) 25 U/L      AST (SGOT) 27 U/L      Alkaline Phosphatase 40 (L) U/L      Total Bilirubin 0.7 mg/dL      eGFR Non African Amer 126 mL/min/1.73      Globulin 2.9 gm/dL      A/G Ratio 1.4 g/dL      BUN/Creatinine Ratio 13.9      Anion Gap 11.0 mmol/L     Lipase [[id number]]  (Normal) Collected:  02/17/17 1100    Specimen:  Blood Updated:  02/17/17 1134     Lipase 63 U/L     Urinalysis With / Culture If Indicated [[id number]]  (Normal) Collected:  02/17/17 1052    Specimen:  Urine from Urine, Clean Catch Updated:  02/17/17 1143     Color, UA Yellow      Appearance, UA Clear      pH, UA 5.5      Specific Gravity, UA 1.029       Result obtained by Refractometer        Glucose, UA Negative      Ketones, UA Negative      Bilirubin, UA Negative      Blood, UA Negative      Protein, UA Negative      Leuk Esterase, UA Negative      Nitrite, UA Negative      Urobilinogen, UA 0.2 E.U./dL     Narrative:       Urine microscopic not indicated.    Tissue Exam [[id number]] Collected:  02/17/17 1332    Specimen:  Tissue from Large Intestine, Appendix Updated:  02/17/17 1358    [place] Draw [[id number]] Collected:  02/17/17 1100    Specimen:  Blood Updated:  02/17/17 1501    Narrative:       The following orders were created for panel order [place] Draw.  Procedure                               Abnormality         Status                     ---------                               -----------         ------                     Light Blue Top[[id number]]                                    Final result               Green Top (Gel)[[id number]]                                   Final result               Lavender Top[[id number]]                                      Final result                Gold Top - SST[[id number]]                                    Final result                 Please view results for these tests on the individual orders.    Light Blue Top [[id number]] Collected:  02/17/17 1100    Specimen:  Blood Updated:  02/17/17 1501     Extra Tube hold for add-on       Auto resulted       Green Top (Gel) [[id number]] Collected:  02/17/17 1100    Specimen:  Blood Updated:  02/17/17 1501     Extra Tube Hold for add-ons.       Auto resulted.       Lavender Top [[id number]] Collected:  02/17/17 1100    Specimen:  Blood Updated:  02/17/17 1501     Extra Tube hold for add-on       Auto resulted       Gold Top - SST [[id number]] Collected:  02/17/17 1100    Specimen:  Blood Updated:  02/17/17 1501     Extra Tube Hold for add-ons.       Auto resulted.       POC Glucose Fingerstick [[id number]]  (Abnormal) Collected:  02/17/17 2212    Specimen:  Blood Updated:  02/17/17 2224     Glucose 230 (H) mg/dL       RN NotifiedMeter: [id number]: [id number] GRISELDA MAZARIEGOS               Assessment/Plan     Sean Ruddyblanka De Souza is a 21 y.o. male who is POD#1 Lap Appy      Home today if able to spontaneously urinate.          This document has been electronically signed by Jamil John MD on February 18, 2017 11:14 AM        Jamil John MD  02/18/17  11:14 AM

## 2017-02-18 NOTE — DISCHARGE SUMMARY
Discharge Summary  Date: 02/18/17  Service: General Surgery  Attending: Jamil John    Procedures: Laparoscopic Appendectomy  Consults: None  Discharge Diagnoses: Acute Appendicitis, Postop urinary retention  Hospital Course: Admitted after uneventful appendectomy.  Had one episode of vasovagal syncope overnight and also had urinary retention requiring two in and out catheterizations.  Otherwise uneventful hospital course.  Was afebrile, tolerating diet, pain well controlled at time of DC.    Discharge Medications:     Your medication list      START taking these medications       Instructions Last Dose Given Next Dose Due    HYDROcodone-acetaminophen 5-325 MG per tablet   Commonly known as:  NORCO        Take 1-2 tablets by mouth Every 4 (Four) Hours As Needed (Pain).           CONTINUE taking these medications       Instructions Last Dose Given Next Dose Due    amphetamine-dextroamphetamine 5 MG tablet   Commonly known as:  ADDERALL        Take 1 tablet by mouth Daily.         amphetamine-dextroamphetamine 20 MG tablet   Commonly known as:  ADDERALL        Take 1 tablet by mouth Daily.         hydrocortisone 2.5 % lotion              loratadine 10 MG tablet   Commonly known as:  CLARITIN              minocycline 50 MG capsule   Commonly known as:  MINOCIN,DYNACIN              sertraline 50 MG tablet   Commonly known as:  ZOLOFT                   Where to Get Your Medications      You can get these medications from any pharmacy     Bring a paper prescription for each of these medications    • HYDROcodone-acetaminophen 5-325 MG per tablet             Activity Instructions     Discharge Activity       1) No driving while taking narcotics.   2) May shower, no tub bath or submerging incisions  3) Do not lift / push / pull more then 15 lbs.                               This document has been electronically signed by Jamil John MD on February 18, 2017 2:55 PM

## 2017-02-23 LAB
LAB AP CASE REPORT: NORMAL
Lab: NORMAL
PATH REPORT.FINAL DX SPEC: NORMAL
PATH REPORT.GROSS SPEC: NORMAL

## 2017-03-06 ENCOUNTER — OFFICE VISIT (OUTPATIENT)
Dept: SURGERY | Facility: CLINIC | Age: 22
End: 2017-03-06

## 2017-03-06 VITALS
BODY MASS INDEX: 25.19 KG/M2 | WEIGHT: 186 LBS | HEIGHT: 72 IN | DIASTOLIC BLOOD PRESSURE: 70 MMHG | SYSTOLIC BLOOD PRESSURE: 122 MMHG

## 2017-03-06 DIAGNOSIS — Z09 FOLLOW UP: Primary | ICD-10-CM

## 2017-03-06 PROCEDURE — 99024 POSTOP FOLLOW-UP VISIT: CPT | Performed by: SURGERY

## 2017-03-06 NOTE — PROGRESS NOTES
CHIEF COMPLAINT:   Chief Complaint   Patient presents with   • Post-op     P.O. Lap. Appendectomy on 2/17/17.       HISTORY OF PRESENT ILLNESS:  This is a 21 y.o. male  who presents for a post-operative visit after undergoing a Laparoscopic Appendectomy on 2/17/17 for acute appendicitis.    Patient reports he is doing well. Eating well without any significant nausea. Having good bowel function. No problems with constipation or diarrhea. No urinary complaints. Denies fever. Ambulating well and slowly returning to normal activities.    Pathology: acute appendicitis, discussed with patient today.    PHYSICAL EXAM:    ABD: Incisions are healing well without any erythema or signs of infection.    A/P:  He will follow-up at our office on a prn basis unless there are any problems.    May shower. Gradually return to normal activity without restrictions.          This document has been electronically signed by Jamil John MD on March 6, 2017 10:14 AM

## 2017-03-27 ENCOUNTER — OFFICE VISIT (OUTPATIENT)
Dept: FAMILY MEDICINE CLINIC | Facility: CLINIC | Age: 22
End: 2017-03-27

## 2017-03-27 VITALS
BODY MASS INDEX: 24.34 KG/M2 | SYSTOLIC BLOOD PRESSURE: 120 MMHG | OXYGEN SATURATION: 100 % | WEIGHT: 179.5 LBS | HEART RATE: 80 BPM | DIASTOLIC BLOOD PRESSURE: 76 MMHG

## 2017-03-27 DIAGNOSIS — F90.0 ADHD, PREDOMINANTLY INATTENTIVE TYPE: Primary | ICD-10-CM

## 2017-03-27 PROCEDURE — 99213 OFFICE O/P EST LOW 20 MIN: CPT | Performed by: FAMILY MEDICINE

## 2017-03-27 RX ORDER — DEXTROAMPHETAMINE SACCHARATE, AMPHETAMINE ASPARTATE, DEXTROAMPHETAMINE SULFATE AND AMPHETAMINE SULFATE 1.25; 1.25; 1.25; 1.25 MG/1; MG/1; MG/1; MG/1
5 TABLET ORAL DAILY
Qty: 30 TABLET | Refills: 0 | Status: SHIPPED | OUTPATIENT
Start: 2017-03-27 | End: 2017-03-27 | Stop reason: SDUPTHER

## 2017-03-27 RX ORDER — DEXTROAMPHETAMINE SACCHARATE, AMPHETAMINE ASPARTATE, DEXTROAMPHETAMINE SULFATE AND AMPHETAMINE SULFATE 5; 5; 5; 5 MG/1; MG/1; MG/1; MG/1
20 TABLET ORAL DAILY
Qty: 30 TABLET | Refills: 0 | Status: SHIPPED | OUTPATIENT
Start: 2017-03-27 | End: 2017-03-27 | Stop reason: SDUPTHER

## 2017-03-27 RX ORDER — DEXTROAMPHETAMINE SACCHARATE, AMPHETAMINE ASPARTATE, DEXTROAMPHETAMINE SULFATE AND AMPHETAMINE SULFATE 5; 5; 5; 5 MG/1; MG/1; MG/1; MG/1
20 TABLET ORAL DAILY
Qty: 30 TABLET | Refills: 0 | Status: SHIPPED | OUTPATIENT
Start: 2017-03-27 | End: 2017-05-03

## 2017-03-27 RX ORDER — DEXTROAMPHETAMINE SACCHARATE, AMPHETAMINE ASPARTATE, DEXTROAMPHETAMINE SULFATE AND AMPHETAMINE SULFATE 1.25; 1.25; 1.25; 1.25 MG/1; MG/1; MG/1; MG/1
5 TABLET ORAL DAILY
Qty: 30 TABLET | Refills: 0 | Status: SHIPPED | OUTPATIENT
Start: 2017-03-27 | End: 2017-05-03 | Stop reason: SDUPTHER

## 2017-03-27 NOTE — PROGRESS NOTES
"Ricardo De Souza is a 21 y.o. male.   compliance with medication regimen  often fidgets with hands or feet or squirms in seat - Yes , often leaves seat in classroom or in other situations in which remaining seated is expected - no, often runs about or climbs excessively in situations in which it is inappropriate or feel restless - No , often has difficulty playing or engaging in leisure  activities quietly - No , is often \"on the go\" or often acts as if \"driven by a motor\" - No  and often talks excessively - No     often blurts out answers before questions have been completed - No , often has difficulty awaiting turn - No  and often interrupts or intrudes on others - No     easliy distracted and trouble concentrating especially at end of dose      History of Present Illness       The following portions of the patient's history were reviewed and updated as appropriate: allergies, past family history, past medical history, past social history, past surgical history and problem list.    Review of Systems   Constitutional: Negative for unexpected weight change.   Cardiovascular: Negative for palpitations.   Psychiatric/Behavioral: Negative for sleep disturbance.       Objective   Physical Exam   Constitutional: He is oriented to person, place, and time. He appears well-developed and well-nourished. No distress.   HENT:   Head: Normocephalic and atraumatic.   Neurological: He is alert and oriented to person, place, and time. No cranial nerve deficit or sensory deficit.   Skin: Skin is warm and dry. He is not diaphoretic.   Psychiatric: He has a normal mood and affect. His behavior is normal. Judgment and thought content normal.   Nursing note and vitals reviewed.      Assessment/Plan   Problems Addressed this Visit        Other    ADHD, predominantly inattentive type - Primary    Relevant Medications    amphetamine-dextroamphetamine (ADDERALL) 20 MG tablet    amphetamine-dextroamphetamine (ADDERALL) 5 MG " tablet

## 2017-05-03 ENCOUNTER — OFFICE VISIT (OUTPATIENT)
Dept: FAMILY MEDICINE CLINIC | Facility: CLINIC | Age: 22
End: 2017-05-03

## 2017-05-03 VITALS
DIASTOLIC BLOOD PRESSURE: 74 MMHG | OXYGEN SATURATION: 100 % | SYSTOLIC BLOOD PRESSURE: 124 MMHG | BODY MASS INDEX: 25.63 KG/M2 | WEIGHT: 189 LBS | HEART RATE: 97 BPM

## 2017-05-03 DIAGNOSIS — F90.0 ADHD, PREDOMINANTLY INATTENTIVE TYPE: Primary | ICD-10-CM

## 2017-05-03 PROCEDURE — 99213 OFFICE O/P EST LOW 20 MIN: CPT | Performed by: FAMILY MEDICINE

## 2017-05-03 RX ORDER — DEXTROAMPHETAMINE SACCHARATE, AMPHETAMINE ASPARTATE, DEXTROAMPHETAMINE SULFATE AND AMPHETAMINE SULFATE 1.25; 1.25; 1.25; 1.25 MG/1; MG/1; MG/1; MG/1
5 TABLET ORAL DAILY
Qty: 30 TABLET | Refills: 0 | Status: SHIPPED | OUTPATIENT
Start: 2017-05-03 | End: 2017-05-03 | Stop reason: SDUPTHER

## 2017-05-03 RX ORDER — DEXTROAMPHETAMINE SACCHARATE, AMPHETAMINE ASPARTATE, DEXTROAMPHETAMINE SULFATE AND AMPHETAMINE SULFATE 1.25; 1.25; 1.25; 1.25 MG/1; MG/1; MG/1; MG/1
5 TABLET ORAL DAILY
Qty: 30 TABLET | Refills: 0 | Status: SHIPPED | OUTPATIENT
Start: 2017-05-03 | End: 2017-07-13 | Stop reason: SDUPTHER

## 2017-05-03 RX ORDER — DEXTROAMPHETAMINE SACCHARATE, AMPHETAMINE ASPARTATE MONOHYDRATE, DEXTROAMPHETAMINE SULFATE AND AMPHETAMINE SULFATE 5; 5; 5; 5 MG/1; MG/1; MG/1; MG/1
20 CAPSULE, EXTENDED RELEASE ORAL EVERY MORNING
Qty: 30 CAPSULE | Refills: 0 | Status: SHIPPED | OUTPATIENT
Start: 2017-05-03 | End: 2017-07-13 | Stop reason: SDUPTHER

## 2017-05-03 RX ORDER — DEXTROAMPHETAMINE SACCHARATE, AMPHETAMINE ASPARTATE MONOHYDRATE, DEXTROAMPHETAMINE SULFATE AND AMPHETAMINE SULFATE 5; 5; 5; 5 MG/1; MG/1; MG/1; MG/1
20 CAPSULE, EXTENDED RELEASE ORAL EVERY MORNING
Qty: 30 CAPSULE | Refills: 0 | Status: SHIPPED | OUTPATIENT
Start: 2017-05-03 | End: 2017-05-03 | Stop reason: SDUPTHER

## 2017-06-06 RX ORDER — MINOCYCLINE HYDROCHLORIDE 50 MG/1
CAPSULE ORAL
Qty: 60 CAPSULE | Refills: 1 | Status: SHIPPED | OUTPATIENT
Start: 2017-06-06 | End: 2017-09-14 | Stop reason: SDUPTHER

## 2017-06-06 RX ORDER — LORATADINE 10 MG/1
TABLET ORAL
Qty: 30 TABLET | Refills: 5 | Status: SHIPPED | OUTPATIENT
Start: 2017-06-06 | End: 2017-11-28

## 2017-07-13 RX ORDER — DEXTROAMPHETAMINE SACCHARATE, AMPHETAMINE ASPARTATE MONOHYDRATE, DEXTROAMPHETAMINE SULFATE AND AMPHETAMINE SULFATE 5; 5; 5; 5 MG/1; MG/1; MG/1; MG/1
20 CAPSULE, EXTENDED RELEASE ORAL EVERY MORNING
Qty: 30 CAPSULE | Refills: 0 | Status: SHIPPED | OUTPATIENT
Start: 2017-07-13 | End: 2017-08-14 | Stop reason: SDUPTHER

## 2017-07-13 RX ORDER — DEXTROAMPHETAMINE SACCHARATE, AMPHETAMINE ASPARTATE, DEXTROAMPHETAMINE SULFATE AND AMPHETAMINE SULFATE 1.25; 1.25; 1.25; 1.25 MG/1; MG/1; MG/1; MG/1
5 TABLET ORAL DAILY
Qty: 30 TABLET | Refills: 0 | Status: SHIPPED | OUTPATIENT
Start: 2017-07-13 | End: 2017-08-14 | Stop reason: SDUPTHER

## 2017-08-14 ENCOUNTER — OFFICE VISIT (OUTPATIENT)
Dept: FAMILY MEDICINE CLINIC | Facility: CLINIC | Age: 22
End: 2017-08-14

## 2017-08-14 VITALS
HEART RATE: 85 BPM | BODY MASS INDEX: 25.98 KG/M2 | SYSTOLIC BLOOD PRESSURE: 122 MMHG | OXYGEN SATURATION: 98 % | HEIGHT: 73 IN | DIASTOLIC BLOOD PRESSURE: 76 MMHG | WEIGHT: 196 LBS

## 2017-08-14 DIAGNOSIS — F90.0 ADHD, PREDOMINANTLY INATTENTIVE TYPE: Primary | ICD-10-CM

## 2017-08-14 PROCEDURE — 99213 OFFICE O/P EST LOW 20 MIN: CPT | Performed by: FAMILY MEDICINE

## 2017-08-14 RX ORDER — DEXTROAMPHETAMINE SACCHARATE, AMPHETAMINE ASPARTATE, DEXTROAMPHETAMINE SULFATE AND AMPHETAMINE SULFATE 1.25; 1.25; 1.25; 1.25 MG/1; MG/1; MG/1; MG/1
5 TABLET ORAL DAILY
Qty: 30 TABLET | Refills: 0 | Status: SHIPPED | OUTPATIENT
Start: 2017-08-14 | End: 2017-09-21 | Stop reason: SDUPTHER

## 2017-08-14 RX ORDER — DEXTROAMPHETAMINE SACCHARATE, AMPHETAMINE ASPARTATE MONOHYDRATE, DEXTROAMPHETAMINE SULFATE AND AMPHETAMINE SULFATE 5; 5; 5; 5 MG/1; MG/1; MG/1; MG/1
20 CAPSULE, EXTENDED RELEASE ORAL EVERY MORNING
Qty: 30 CAPSULE | Refills: 0 | Status: SHIPPED | OUTPATIENT
Start: 2017-08-14 | End: 2017-09-21 | Stop reason: SDUPTHER

## 2017-08-14 RX ORDER — DEXTROAMPHETAMINE SACCHARATE, AMPHETAMINE ASPARTATE, DEXTROAMPHETAMINE SULFATE AND AMPHETAMINE SULFATE 1.25; 1.25; 1.25; 1.25 MG/1; MG/1; MG/1; MG/1
5 TABLET ORAL DAILY
Qty: 30 TABLET | Refills: 0 | Status: SHIPPED | OUTPATIENT
Start: 2017-08-14 | End: 2017-08-14 | Stop reason: SDUPTHER

## 2017-08-14 RX ORDER — DEXTROAMPHETAMINE SACCHARATE, AMPHETAMINE ASPARTATE MONOHYDRATE, DEXTROAMPHETAMINE SULFATE AND AMPHETAMINE SULFATE 5; 5; 5; 5 MG/1; MG/1; MG/1; MG/1
20 CAPSULE, EXTENDED RELEASE ORAL EVERY MORNING
Qty: 30 CAPSULE | Refills: 0 | Status: SHIPPED | OUTPATIENT
Start: 2017-08-14 | End: 2017-08-14 | Stop reason: SDUPTHER

## 2017-08-14 NOTE — PROGRESS NOTES
"Ricardo De Souza is a 22 y.o. male.   compliance with medication regimen  often fidgets with hands or feet or squirms in seat - Yes  - no, often runs about or climbs excessively in situations in which it is inappropriate or feel restless - No ,  , is often \"on the go\" or often acts as if \"driven by a motor\" - No  and often talks excessively - No loss of concentration - yes    often blurts out answers before questions have been completed - No , often has difficulty awaiting turn - No  and often interrupts or intrudes on others - No     easliy distracted and trouble concentrating especially at end of dose      History of Present Illness       The following portions of the patient's history were reviewed and updated as appropriate: allergies, past family history, past medical history, past social history, past surgical history and problem list.    Review of Systems   Constitutional: Negative for unexpected weight change.   Cardiovascular: Negative for palpitations.   Psychiatric/Behavioral: Negative for sleep disturbance.       Objective   Physical Exam   Constitutional: He is oriented to person, place, and time. He appears well-developed and well-nourished. No distress.   HENT:   Head: Normocephalic and atraumatic.   Neurological: He is alert and oriented to person, place, and time. No cranial nerve deficit or sensory deficit.   Reflex Scores:       Patellar reflexes are 2+ on the right side and 2+ on the left side.  Skin: Skin is warm and dry. He is not diaphoretic.   Psychiatric: He has a normal mood and affect. His behavior is normal. Judgment and thought content normal.   Nursing note and vitals reviewed.      Assessment/Plan   Problems Addressed this Visit        Other    ADHD, predominantly inattentive type - Primary    Relevant Medications    amphetamine-dextroamphetamine (ADDERALL) 5 MG tablet    amphetamine-dextroamphetamine XR (ADDERALL XR) 20 MG 24 hr capsule                "

## 2017-09-14 RX ORDER — MINOCYCLINE HYDROCHLORIDE 50 MG/1
CAPSULE ORAL
Qty: 60 CAPSULE | Refills: 1 | Status: SHIPPED | OUTPATIENT
Start: 2017-09-14 | End: 2017-11-28

## 2017-09-21 ENCOUNTER — TELEPHONE (OUTPATIENT)
Dept: FAMILY MEDICINE CLINIC | Facility: CLINIC | Age: 22
End: 2017-09-21

## 2017-09-21 RX ORDER — DEXTROAMPHETAMINE SACCHARATE, AMPHETAMINE ASPARTATE, DEXTROAMPHETAMINE SULFATE AND AMPHETAMINE SULFATE 1.25; 1.25; 1.25; 1.25 MG/1; MG/1; MG/1; MG/1
5 TABLET ORAL DAILY
Qty: 30 TABLET | Refills: 0 | Status: SHIPPED | OUTPATIENT
Start: 2017-09-21 | End: 2017-11-14 | Stop reason: SDUPTHER

## 2017-09-21 RX ORDER — DEXTROAMPHETAMINE SACCHARATE, AMPHETAMINE ASPARTATE MONOHYDRATE, DEXTROAMPHETAMINE SULFATE AND AMPHETAMINE SULFATE 5; 5; 5; 5 MG/1; MG/1; MG/1; MG/1
20 CAPSULE, EXTENDED RELEASE ORAL EVERY MORNING
Qty: 30 CAPSULE | Refills: 0 | Status: SHIPPED | OUTPATIENT
Start: 2017-09-21 | End: 2017-11-14 | Stop reason: SDUPTHER

## 2017-09-21 NOTE — TELEPHONE ENCOUNTER
----- Message from Bridger Martínez MD sent at 2017  3:35 PM CDT -----  Contact: mom  done  ----- Message -----     From: Sav Haynes MA     Sent: 2017   3:19 PM       To: Bridger Martínez MD    Need new Rxs for Adderall for month of October, the last one will .    Thank you    ----- Message -----     From: Gertrudis ZAMAN Cesar     Sent: 2017   2:54 PM       To: Sav Haynes MA    Sean's mom called and said, she needs to bring Sean's 2 adderall rx's in and get Dr. Martínez to write new ones because the date will be off by 1 day.  He doesn't come back to see Dr. Martínez until .   [phone]  Asking that you call her when these are ready.

## 2017-10-31 ENCOUNTER — OFFICE VISIT (OUTPATIENT)
Dept: GASTROENTEROLOGY | Facility: CLINIC | Age: 22
End: 2017-10-31

## 2017-10-31 ENCOUNTER — APPOINTMENT (OUTPATIENT)
Dept: LAB | Facility: HOSPITAL | Age: 22
End: 2017-10-31

## 2017-10-31 VITALS
BODY MASS INDEX: 25.58 KG/M2 | WEIGHT: 193 LBS | SYSTOLIC BLOOD PRESSURE: 110 MMHG | DIASTOLIC BLOOD PRESSURE: 66 MMHG | HEART RATE: 81 BPM | HEIGHT: 73 IN

## 2017-10-31 DIAGNOSIS — R19.4 ENCOUNTER FOR DIAGNOSTIC COLONOSCOPY DUE TO CHANGE IN BOWEL HABITS: ICD-10-CM

## 2017-10-31 DIAGNOSIS — R10.84 GENERALIZED ABDOMINAL PAIN: Primary | ICD-10-CM

## 2017-10-31 DIAGNOSIS — R11.0 NAUSEA: ICD-10-CM

## 2017-10-31 LAB
ALBUMIN SERPL-MCNC: 4.8 G/DL (ref 3.4–4.8)
ALBUMIN/GLOB SERPL: 1.5 G/DL (ref 1.1–1.8)
ALP SERPL-CCNC: 37 U/L (ref 38–126)
ALT SERPL W P-5'-P-CCNC: 35 U/L (ref 21–72)
ANION GAP SERPL CALCULATED.3IONS-SCNC: 15 MMOL/L (ref 5–15)
AST SERPL-CCNC: 37 U/L (ref 17–59)
BASOPHILS # BLD AUTO: 0.03 10*3/MM3 (ref 0–0.2)
BASOPHILS NFR BLD AUTO: 0.6 % (ref 0–2)
BILIRUB SERPL-MCNC: 0.6 MG/DL (ref 0.2–1.3)
BUN BLD-MCNC: 10 MG/DL (ref 7–21)
BUN/CREAT SERPL: 11.8 (ref 7–25)
CALCIUM SPEC-SCNC: 10.3 MG/DL (ref 8.4–10.2)
CHLORIDE SERPL-SCNC: 98 MMOL/L (ref 95–110)
CO2 SERPL-SCNC: 28 MMOL/L (ref 22–31)
CREAT BLD-MCNC: 0.85 MG/DL (ref 0.7–1.3)
DEPRECATED RDW RBC AUTO: 41.8 FL (ref 35.1–43.9)
EOSINOPHIL # BLD AUTO: 0.06 10*3/MM3 (ref 0–0.7)
EOSINOPHIL NFR BLD AUTO: 1.1 % (ref 0–7)
ERYTHROCYTE [DISTWIDTH] IN BLOOD BY AUTOMATED COUNT: 13.6 % (ref 11.5–14.5)
GFR SERPL CREATININE-BSD FRML MDRD: 115 ML/MIN/1.73 (ref 77–179)
GLOBULIN UR ELPH-MCNC: 3.3 GM/DL (ref 2.3–3.5)
GLUCOSE BLD-MCNC: 84 MG/DL (ref 60–100)
HCT VFR BLD AUTO: 46 % (ref 39–49)
HGB BLD-MCNC: 15.4 G/DL (ref 13.7–17.3)
IMM GRANULOCYTES # BLD: 0.01 10*3/MM3 (ref 0–0.02)
IMM GRANULOCYTES NFR BLD: 0.2 % (ref 0–0.5)
LYMPHOCYTES # BLD AUTO: 2.11 10*3/MM3 (ref 0.6–4.2)
LYMPHOCYTES NFR BLD AUTO: 39.4 % (ref 10–50)
MCH RBC QN AUTO: 28.2 PG (ref 26.5–34)
MCHC RBC AUTO-ENTMCNC: 33.5 G/DL (ref 31.5–36.3)
MCV RBC AUTO: 84.1 FL (ref 80–98)
MONOCYTES # BLD AUTO: 0.23 10*3/MM3 (ref 0–0.9)
MONOCYTES NFR BLD AUTO: 4.3 % (ref 0–12)
NEUTROPHILS # BLD AUTO: 2.92 10*3/MM3 (ref 2–8.6)
NEUTROPHILS NFR BLD AUTO: 54.4 % (ref 37–80)
PLATELET # BLD AUTO: 276 10*3/MM3 (ref 150–450)
PMV BLD AUTO: 9.4 FL (ref 8–12)
POTASSIUM BLD-SCNC: 4.4 MMOL/L (ref 3.5–5.1)
PROT SERPL-MCNC: 8.1 G/DL (ref 6.3–8.6)
RBC # BLD AUTO: 5.47 10*6/MM3 (ref 4.37–5.74)
SODIUM BLD-SCNC: 141 MMOL/L (ref 137–145)
WBC NRBC COR # BLD: 5.36 10*3/MM3 (ref 3.2–9.8)

## 2017-10-31 PROCEDURE — 82397 CHEMILUMINESCENT ASSAY: CPT | Performed by: NURSE PRACTITIONER

## 2017-10-31 PROCEDURE — 36415 COLL VENOUS BLD VENIPUNCTURE: CPT | Performed by: NURSE PRACTITIONER

## 2017-10-31 PROCEDURE — 81479 UNLISTED MOLECULAR PATHOLOGY: CPT | Performed by: NURSE PRACTITIONER

## 2017-10-31 PROCEDURE — 80053 COMPREHEN METABOLIC PANEL: CPT | Performed by: NURSE PRACTITIONER

## 2017-10-31 PROCEDURE — 83520 IMMUNOASSAY QUANT NOS NONAB: CPT | Performed by: NURSE PRACTITIONER

## 2017-10-31 PROCEDURE — 85025 COMPLETE CBC W/AUTO DIFF WBC: CPT | Performed by: NURSE PRACTITIONER

## 2017-10-31 PROCEDURE — 88350 IMFLUOR EA ADDL 1ANTB STN PX: CPT | Performed by: NURSE PRACTITIONER

## 2017-10-31 PROCEDURE — 86140 C-REACTIVE PROTEIN: CPT | Performed by: NURSE PRACTITIONER

## 2017-10-31 PROCEDURE — 88346 IMFLUOR 1ST 1ANTB STAIN PX: CPT | Performed by: NURSE PRACTITIONER

## 2017-10-31 PROCEDURE — 81479 UNLISTED MOLECULAR PATHOLOGY: CPT

## 2017-10-31 PROCEDURE — 99214 OFFICE O/P EST MOD 30 MIN: CPT | Performed by: NURSE PRACTITIONER

## 2017-10-31 RX ORDER — DEXTROSE AND SODIUM CHLORIDE 5; .45 G/100ML; G/100ML
30 INJECTION, SOLUTION INTRAVENOUS CONTINUOUS PRN
Status: CANCELLED | OUTPATIENT
Start: 2017-11-29

## 2017-10-31 RX ORDER — BUSPIRONE HYDROCHLORIDE 5 MG/1
5 TABLET ORAL 3 TIMES DAILY
COMMUNITY
End: 2018-02-15 | Stop reason: SDUPTHER

## 2017-10-31 RX ORDER — SODIUM, POTASSIUM,MAG SULFATES 17.5-3.13G
1 SOLUTION, RECONSTITUTED, ORAL ORAL EVERY 12 HOURS
Qty: 2 BOTTLE | Refills: 0 | Status: SHIPPED | OUTPATIENT
Start: 2017-10-31 | End: 2017-11-14 | Stop reason: CLARIF

## 2017-10-31 NOTE — PROGRESS NOTES
Chief Complaint   Patient presents with   • Abdominal Pain   • Diarrhea       Subjective    Sean De Souza is a 22 y.o. male. he is being seen for consultation today at the request of Imani Castle PA-C    History of Present Illness  Patient is a 22-year-old male presents due to worsening abdominal pain, diarrhea and nausea.  Describes pain as constant abdominal cramping and states bowel habits will vary from multiple times per day to constipated for several days.  He is been seen multiple times in urgent care.  States symptoms seem to start after he had his appendix removed in February of this year.  States he was given Bentyl however unable to take it due to side effects while working.  He denies any visible blood in the stool.  Plan; we'll schedule patient for EGD and colonoscopy due to generalized abdominal pain, nausea and changes in bowel habits.  Gastrointestinal pathogen panel, CBC, CMP, IBD panel.  We'll order ultrasound of the abdomen.  Follow-up after test return to office sooner if needed.       The following portions of the patient's history were reviewed and updated as appropriate:   Past Medical History:   Diagnosis Date   • Attention deficit hyperactivity disorder    • Elevated blood pressure reading     without diagnosis of hypertension   • Encounter for screening for infections with a predominantly sexual mode of transmission    • Fatigue    • Generalized anxiety disorder    • Sinus headache    • Sore throat      Past Surgical History:   Procedure Laterality Date   • APPENDECTOMY N/A 2/17/2017    Procedure: APPENDECTOMY LAPAROSCOPIC ;  Surgeon: Jamil John MD;  Location: [facility];  Service:    • EXTERNAL EAR SURGERY       Family History   Problem Relation Age of Onset   • Hypertension Mother    • No Known Problems Father        Current Outpatient Prescriptions   Medication Sig Dispense Refill   • amphetamine-dextroamphetamine (ADDERALL) 5 MG tablet Take 1 tablet by mouth Daily.  30 tablet 0   • amphetamine-dextroamphetamine XR (ADDERALL XR) 20 MG 24 hr capsule Take 1 capsule by mouth Every Morning. 30 capsule 0   • busPIRone (BUSPAR) 5 MG tablet Take 5 mg by mouth 3 (Three) Times a Day.     • dicyclomine (BENTYL) 10 MG capsule Take 1 capsule by mouth Every 8 (Eight) Hours As Needed (cramping/diarrhea). 15 capsule 0   • hydrocortisone 2.5 % lotion Apply  topically 2 (Two) Times a Day. Apply to affected area     • loratadine (CLARITIN) 10 MG tablet TAKE 1 TABLET BY MOUTH ONCE DAILY 30 tablet 5   • minocycline (MINOCIN,DYNACIN) 50 MG capsule TAKE ONE CAPSULE BY MOUTH TWICE A DAY 60 capsule 1   • ondansetron ODT (ZOFRAN-ODT) 4 MG disintegrating tablet Take 1 tablet by mouth Every 6 (Six) Hours As Needed for Nausea or Vomiting. 12 tablet 0   • sodium-potassium-magnesium sulfates (SUPREP BOWEL PREP KIT) 17.5-3.13-1.6 GM/180ML solution oral solution Take 1 bottle by mouth Every 12 (Twelve) Hours. 2 bottle 0     No current facility-administered medications for this visit.      Allergies   Allergen Reactions   • Biaxin [Clarithromycin]    • Cefzil [Cefprozil]    • Penicillins      Social History     Social History   • Marital status:      Spouse name: N/A   • Number of children: N/A   • Years of education: N/A     Social History Main Topics   • Smoking status: Never Smoker   • Smokeless tobacco: Never Used   • Alcohol use No   • Drug use: No   • Sexual activity: Defer     Other Topics Concern   • None     Social History Narrative       Review of Systems  Review of Systems   Constitutional: Positive for activity change and fatigue. Negative for appetite change, chills, diaphoresis, fever and unexpected weight change.   HENT: Negative for sore throat and trouble swallowing.    Respiratory: Negative for shortness of breath.    Gastrointestinal: Positive for abdominal pain (cramping abdominal pain ), blood in stool (intermittent bright red) and diarrhea. Negative for abdominal distention, anal  "bleeding, constipation, nausea, rectal pain and vomiting.   Musculoskeletal: Negative for arthralgias.   Skin: Negative for pallor.   Neurological: Negative for light-headedness.        /66 (BP Location: Left arm, Patient Position: Sitting, Cuff Size: Adult)  Pulse 81  Ht 73\" (185.4 cm)  Wt 193 lb (87.5 kg)  BMI 25.46 kg/m2    Objective    Physical Exam   Constitutional: He is oriented to person, place, and time. He appears well-developed and well-nourished. He is cooperative. No distress.   HENT:   Head: Normocephalic and atraumatic.   Neck: Normal range of motion. Neck supple. No thyromegaly present.   Cardiovascular: Normal rate, regular rhythm and normal heart sounds.    Pulmonary/Chest: Effort normal and breath sounds normal. He has no wheezes. He has no rhonchi. He has no rales.   Abdominal: Soft. Normal appearance and bowel sounds are normal. He exhibits no shifting dullness and no distension. There is no hepatosplenomegaly. There is no tenderness. There is no rigidity and no guarding. No hernia.   Lymphadenopathy:     He has no cervical adenopathy.   Neurological: He is alert and oriented to person, place, and time.   Skin: Skin is warm, dry and intact. No rash noted. No pallor.   Psychiatric: He has a normal mood and affect. His speech is normal.     Admission on 02/23/2017, Discharged on 02/23/2017   Component Date Value Ref Range Status   • Rapid Strep A Screen 02/23/2017 Negative  Negative, VALID, INVALID, Not Performed Final   • Internal Control 02/23/2017 Passed  Passed Final   • Lot Number 02/23/2017 [id number]   Final   • Expiration Date 02/23/2017 2/18   Final     Assessment/Plan      1. Generalized abdominal pain    2. Encounter for diagnostic colonoscopy due to change in bowel habits    3. Nausea    .       Orders placed during this encounter include:  Orders Placed This Encounter   Procedures   • Gastrointestinal Panel, PCR - Stool, Per Rectum   • US Abdomen Complete     Order Specific " Question:   Reason for Exam:     Answer:   abdominal pain   • Comprehensive Metabolic Panel   • IBD Sgi Diagnostic   • CBC Auto Differential   • CBC & Differential     Order Specific Question:   Manual Differential     Answer:   No       ESOPHAGOGASTRODUODENOSCOPY (N/A), COLONOSCOPY (N/A)    Review and/or summary of lab tests, radiology, procedures, medications. Review and summary of old records and obtaining of history. The risks and benefits of my recommendations, as well as other treatment options were discussed with the patient today. Questions were answered.    New Medications Ordered This Visit   Medications   • sodium-potassium-magnesium sulfates (SUPREP BOWEL PREP KIT) 17.5-3.13-1.6 GM/180ML solution oral solution     Sig: Take 1 bottle by mouth Every 12 (Twelve) Hours.     Dispense:  2 bottle     Refill:  0       Follow-up: Return in about 4 weeks (around 11/28/2017).          This document has been electronically signed by REDDY Bhatia on October 31, 2017 2:37 PM             Results for orders placed or performed in visit on 10/31/17   CBC Auto Differential   Result Value Ref Range    WBC 5.36 3.20 - 9.80 10*3/mm3    RBC 5.47 4.37 - 5.74 10*6/mm3    Hemoglobin 15.4 13.7 - 17.3 g/dL    Hematocrit 46.0 39.0 - 49.0 %    MCV 84.1 80.0 - 98.0 fL    MCH 28.2 26.5 - 34.0 pg    MCHC 33.5 31.5 - 36.3 g/dL    RDW 13.6 11.5 - 14.5 %    RDW-SD 41.8 35.1 - 43.9 fl    MPV 9.4 8.0 - 12.0 fL    Platelets 276 150 - 450 10*3/mm3    Neutrophil % 54.4 37.0 - 80.0 %    Lymphocyte % 39.4 10.0 - 50.0 %    Monocyte % 4.3 0.0 - 12.0 %    Eosinophil % 1.1 0.0 - 7.0 %    Basophil % 0.6 0.0 - 2.0 %    Immature Grans % 0.2 0.0 - 0.5 %    Neutrophils, Absolute 2.92 2.00 - 8.60 10*3/mm3    Lymphocytes, Absolute 2.11 0.60 - 4.20 10*3/mm3    Monocytes, Absolute 0.23 0.00 - 0.90 10*3/mm3    Eosinophils, Absolute 0.06 0.00 - 0.70 10*3/mm3    Basophils, Absolute 0.03 0.00 - 0.20 10*3/mm3    Immature Grans, Absolute 0.01 0.00 - 0.02  10*3/mm3   Comprehensive Metabolic Panel   Result Value Ref Range    Glucose 84 60 - 100 mg/dL    BUN 10 7 - 21 mg/dL    Creatinine 0.85 0.70 - 1.30 mg/dL    Sodium 141 137 - 145 mmol/L    Potassium 4.4 3.5 - 5.1 mmol/L    Chloride 98 95 - 110 mmol/L    CO2 28.0 22.0 - 31.0 mmol/L    Calcium 10.3 (H) 8.4 - 10.2 mg/dL    Total Protein 8.1 6.3 - 8.6 g/dL    Albumin 4.80 3.40 - 4.80 g/dL    ALT (SGPT) 35 21 - 72 U/L    AST (SGOT) 37 17 - 59 U/L    Alkaline Phosphatase 37 (L) 38 - 126 U/L    Total Bilirubin 0.6 0.2 - 1.3 mg/dL    eGFR Non  Amer 115 77 - 179 mL/min/1.73    Globulin 3.3 2.3 - 3.5 gm/dL    A/G Ratio 1.5 1.1 - 1.8 g/dL    BUN/Creatinine Ratio 11.8 7.0 - 25.0    Anion Gap 15.0 5.0 - 15.0 mmol/L   Results for orders placed or performed during the hospital encounter of 02/23/17   POCT Rapid Strep A   Result Value Ref Range    Rapid Strep A Screen Negative Negative, VALID, INVALID, Not Performed    Internal Control Passed Passed    Lot Number [id number]     Expiration Date 2/18    Results for orders placed or performed during the hospital encounter of 02/17/17   Gold Top - SST   Result Value Ref Range    Extra Tube Hold for add-ons.    Green Top (Gel)   Result Value Ref Range    Extra Tube Hold for add-ons.    Urinalysis With / Culture If Indicated   Result Value Ref Range    Color, UA Yellow Yellow, Straw, Dark Yellow, Vale    Appearance, UA Clear Clear    pH, UA 5.5 5.0 - 9.0    Specific Gravity, UA 1.029 1.003 - 1.030    Glucose, UA Negative Negative    Ketones, UA Negative Negative    Bilirubin, UA Negative Negative    Blood, UA Negative Negative    Protein, UA Negative Negative    Leuk Esterase, UA Negative Negative    Nitrite, UA Negative Negative    Urobilinogen, UA 0.2 E.U./dL 0.2 - 1.0 E.U./dL   CBC Auto Differential   Result Value Ref Range    WBC 9.24 3.20 - 9.80 10*3/mm3    RBC 4.97 4.37 - 5.74 10*6/mm3    Hemoglobin 14.1 13.7 - 17.3 g/dL    Hematocrit 41.8 39.0 - 49.0 %    MCV 84.1 80.0 - 98.0  "fL    MCH 28.4 26.5 - 34.0 pg    MCHC 33.7 31.5 - 36.3 g/dL    RDW 14.1 11.5 - 14.5 %    RDW-SD 43.3 35.1 - 43.9 fl    MPV 9.4 8.0 - 12.0 fL    Platelets 240 150 - 450 10*3/mm3    Neutrophil % 62.6 37.0 - 80.0 %    Lymphocyte % 27.5 10.0 - 50.0 %    Monocyte % 8.4 0.0 - 12.0 %    Eosinophil % 1.3 0.0 - 7.0 %    Basophil % 0.1 0.0 - 2.0 %    Immature Grans % 0.1 0.0 - 0.5 %    Neutrophils, Absolute 5.78 2.00 - 8.60 10*3/mm3    Lymphocytes, Absolute 2.54 0.60 - 4.20 10*3/mm3    Monocytes, Absolute 0.78 0.00 - 0.90 10*3/mm3    Eosinophils, Absolute 0.12 0.00 - 0.70 10*3/mm3    Basophils, Absolute 0.01 0.00 - 0.20 10*3/mm3    Immature Grans, Absolute 0.01 0.00 - 0.02 10*3/mm3   Lavender Top   Result Value Ref Range    Extra Tube hold for add-on    Light Blue Top   Result Value Ref Range    Extra Tube hold for add-on    Tissue Exam   Result Value Ref Range    Case Report       Surgical Pathology Report                         Case: [id number]                                  Authorizing Provider:  Jamil John MD Collected:           02/17/2017 01:32 PM          Ordering Location:     [facility]             Received:            02/17/2017 01:58 PM                                 [place]                                                              Pathologist:           Mark Rahman MD                                                            Specimen:    Large Intestine, Appendix                                                                  Final Diagnosis       VERMIFORM APPENDIX:      ACUTE APPENDICITIS.   MESENTERIC LYMPH NODE:      NO SIGNIFICANT HISTOLOGIC ABNORMALITY.      Gross Description       The specimen is labeled \"appendix\".  This measures 7.0 cm in length and up to 1.2 cm in diameter.  The serosal surface shows capillary congestion.  The staple lines are trimmed and the appendix is serially sectioned and entirely submitted in 3 blocks.      Embedded Images     POC Glucose " Fingerstick   Result Value Ref Range    Glucose 230 (H) 70 - 130 mg/dL   Lipase   Result Value Ref Range    Lipase 63 25 - 110 U/L   Comprehensive Metabolic Panel   Result Value Ref Range    Glucose 91 60 - 100 mg/dL    BUN 11 7 - 21 mg/dL    Creatinine 0.79 0.70 - 1.30 mg/dL    Sodium 142 137 - 145 mmol/L    Potassium 3.8 3.5 - 5.1 mmol/L    Chloride 101 95 - 110 mmol/L    CO2 30.0 22.0 - 31.0 mmol/L    Calcium 9.2 8.4 - 10.2 mg/dL    Total Protein 7.0 6.3 - 8.6 g/dL    Albumin 4.10 3.40 - 4.80 g/dL    ALT (SGPT) 25 21 - 72 U/L    AST (SGOT) 27 17 - 59 U/L    Alkaline Phosphatase 40 (L) 65 - 260 U/L    Total Bilirubin 0.7 0.2 - 1.3 mg/dL    eGFR Non  Amer 126 77 - 179 mL/min/1.73    Globulin 2.9 2.3 - 3.5 gm/dL    A/G Ratio 1.4 1.1 - 1.8 g/dL    BUN/Creatinine Ratio 13.9 7.0 - 25.0    Anion Gap 11.0 5.0 - 15.0 mmol/L     *Note: Due to a large number of results and/or encounters for the requested time period, some results have not been displayed. A complete set of results can be found in Results Review.

## 2017-11-07 LAB — SPECIMEN STATUS: NORMAL

## 2017-11-14 ENCOUNTER — OFFICE VISIT (OUTPATIENT)
Dept: FAMILY MEDICINE CLINIC | Facility: CLINIC | Age: 22
End: 2017-11-14

## 2017-11-14 VITALS
WEIGHT: 196.6 LBS | HEART RATE: 91 BPM | DIASTOLIC BLOOD PRESSURE: 80 MMHG | SYSTOLIC BLOOD PRESSURE: 126 MMHG | OXYGEN SATURATION: 98 % | BODY MASS INDEX: 26.06 KG/M2 | HEIGHT: 73 IN

## 2017-11-14 DIAGNOSIS — F90.0 ADHD, PREDOMINANTLY INATTENTIVE TYPE: Primary | ICD-10-CM

## 2017-11-14 PROCEDURE — 99213 OFFICE O/P EST LOW 20 MIN: CPT | Performed by: FAMILY MEDICINE

## 2017-11-14 RX ORDER — DEXTROAMPHETAMINE SACCHARATE, AMPHETAMINE ASPARTATE MONOHYDRATE, DEXTROAMPHETAMINE SULFATE AND AMPHETAMINE SULFATE 5; 5; 5; 5 MG/1; MG/1; MG/1; MG/1
20 CAPSULE, EXTENDED RELEASE ORAL EVERY MORNING
Qty: 30 CAPSULE | Refills: 0 | Status: SHIPPED | OUTPATIENT
Start: 2017-11-14 | End: 2017-11-14 | Stop reason: SDUPTHER

## 2017-11-14 RX ORDER — DEXTROAMPHETAMINE SACCHARATE, AMPHETAMINE ASPARTATE, DEXTROAMPHETAMINE SULFATE AND AMPHETAMINE SULFATE 1.25; 1.25; 1.25; 1.25 MG/1; MG/1; MG/1; MG/1
5 TABLET ORAL DAILY
Qty: 30 TABLET | Refills: 0 | Status: SHIPPED | OUTPATIENT
Start: 2017-11-14 | End: 2017-11-14

## 2017-11-14 RX ORDER — DEXTROAMPHETAMINE SACCHARATE, AMPHETAMINE ASPARTATE, DEXTROAMPHETAMINE SULFATE AND AMPHETAMINE SULFATE 1.25; 1.25; 1.25; 1.25 MG/1; MG/1; MG/1; MG/1
5 TABLET ORAL DAILY
Qty: 30 TABLET | Refills: 0 | Status: SHIPPED | OUTPATIENT
Start: 2017-11-14 | End: 2017-11-14 | Stop reason: SDUPTHER

## 2017-11-14 RX ORDER — DEXTROAMPHETAMINE SACCHARATE, AMPHETAMINE ASPARTATE MONOHYDRATE, DEXTROAMPHETAMINE SULFATE AND AMPHETAMINE SULFATE 5; 5; 5; 5 MG/1; MG/1; MG/1; MG/1
20 CAPSULE, EXTENDED RELEASE ORAL EVERY MORNING
Qty: 30 CAPSULE | Refills: 0 | Status: SHIPPED | OUTPATIENT
Start: 2017-11-14 | End: 2018-02-15 | Stop reason: SDUPTHER

## 2017-11-14 RX ORDER — DEXTROAMPHETAMINE SACCHARATE, AMPHETAMINE ASPARTATE MONOHYDRATE, DEXTROAMPHETAMINE SULFATE AND AMPHETAMINE SULFATE 5; 5; 5; 5 MG/1; MG/1; MG/1; MG/1
20 CAPSULE, EXTENDED RELEASE ORAL EVERY MORNING
Qty: 30 CAPSULE | Refills: 0 | Status: SHIPPED | OUTPATIENT
Start: 2017-11-14 | End: 2017-11-14

## 2017-11-14 NOTE — PROGRESS NOTES
"Ricardo De Souza is a 22 y.o. male.   compliance with medication regimen  often fidgets with hands or feet or squirms in seat - no  - no, often runs about or climbs excessively in situations in which it is inappropriate or feel restless - No ,  , is often \"on the go\" or often acts as if \"driven by a motor\" - No  and often talks excessively - No loss of concentration - yes    often blurts out answers before questions have been completed - No , often has difficulty awaiting turn - No  and often interrupts or intrudes on others - No     easliy distracted and trouble concentrating especially at end of dose      History of Present Illness       The following portions of the patient's history were reviewed and updated as appropriate: allergies, past family history, past medical history, past social history, past surgical history and problem list.    Review of Systems   Constitutional: Negative for unexpected weight change.   Cardiovascular: Negative for palpitations.   Psychiatric/Behavioral: Positive for sleep disturbance.       Objective   Physical Exam   Constitutional: He is oriented to person, place, and time. He appears well-developed and well-nourished. No distress.   HENT:   Head: Normocephalic and atraumatic.   Neurological: He is alert and oriented to person, place, and time. No cranial nerve deficit or sensory deficit.   Reflex Scores:       Patellar reflexes are 2+ on the right side and 2+ on the left side.  Skin: Skin is warm and dry. He is not diaphoretic.   Psychiatric: He has a normal mood and affect. His behavior is normal. Judgment and thought content normal.   Nursing note and vitals reviewed.      Assessment/Plan   Problems Addressed this Visit        Other    ADHD, predominantly inattentive type - Primary    Relevant Medications    amphetamine-dextroamphetamine XR (ADDERALL XR) 20 MG 24 hr capsule                "

## 2017-11-15 ENCOUNTER — TELEPHONE (OUTPATIENT)
Dept: FAMILY MEDICINE CLINIC | Facility: CLINIC | Age: 22
End: 2017-11-15

## 2017-11-28 RX ORDER — LORATADINE 10 MG/1
10 TABLET ORAL DAILY
COMMUNITY
End: 2018-02-15 | Stop reason: SDUPTHER

## 2017-11-28 RX ORDER — MINOCYCLINE HYDROCHLORIDE 50 MG/1
50 CAPSULE ORAL 2 TIMES DAILY
COMMUNITY
End: 2017-11-29 | Stop reason: SDUPTHER

## 2017-11-29 ENCOUNTER — ANESTHESIA (OUTPATIENT)
Dept: GASTROENTEROLOGY | Facility: HOSPITAL | Age: 22
End: 2017-11-29

## 2017-11-29 ENCOUNTER — TELEPHONE (OUTPATIENT)
Dept: FAMILY MEDICINE CLINIC | Facility: CLINIC | Age: 22
End: 2017-11-29

## 2017-11-29 ENCOUNTER — HOSPITAL ENCOUNTER (OUTPATIENT)
Facility: HOSPITAL | Age: 22
Setting detail: HOSPITAL OUTPATIENT SURGERY
Discharge: HOME | End: 2017-11-29
Attending: INTERNAL MEDICINE | Admitting: INTERNAL MEDICINE

## 2017-11-29 ENCOUNTER — ANESTHESIA EVENT (OUTPATIENT)
Dept: GASTROENTEROLOGY | Facility: HOSPITAL | Age: 22
End: 2017-11-29

## 2017-11-29 ENCOUNTER — HOSPITAL ENCOUNTER (OUTPATIENT)
Dept: ULTRASOUND IMAGING | Facility: HOSPITAL | Age: 22
Discharge: HOME | End: 2017-11-29

## 2017-11-29 VITALS
OXYGEN SATURATION: 98 % | WEIGHT: 187.17 LBS | SYSTOLIC BLOOD PRESSURE: 104 MMHG | HEART RATE: 60 BPM | TEMPERATURE: 96.8 F | RESPIRATION RATE: 18 BRPM | BODY MASS INDEX: 24.81 KG/M2 | HEIGHT: 73 IN | DIASTOLIC BLOOD PRESSURE: 65 MMHG

## 2017-11-29 DIAGNOSIS — R10.84 GENERALIZED ABDOMINAL PAIN: ICD-10-CM

## 2017-11-29 DIAGNOSIS — R19.4 ENCOUNTER FOR DIAGNOSTIC COLONOSCOPY DUE TO CHANGE IN BOWEL HABITS: ICD-10-CM

## 2017-11-29 DIAGNOSIS — R11.0 NAUSEA: ICD-10-CM

## 2017-11-29 PROCEDURE — 25010000002 PROPOFOL 10 MG/ML EMULSION: Performed by: NURSE ANESTHETIST, CERTIFIED REGISTERED

## 2017-11-29 PROCEDURE — 88305 TISSUE EXAM BY PATHOLOGIST: CPT | Performed by: PATHOLOGY

## 2017-11-29 PROCEDURE — 76700 US EXAM ABDOM COMPLETE: CPT

## 2017-11-29 PROCEDURE — 43239 EGD BIOPSY SINGLE/MULTIPLE: CPT | Performed by: INTERNAL MEDICINE

## 2017-11-29 PROCEDURE — 45380 COLONOSCOPY AND BIOPSY: CPT | Performed by: INTERNAL MEDICINE

## 2017-11-29 PROCEDURE — 25010000002 MIDAZOLAM PER 1 MG: Performed by: NURSE ANESTHETIST, CERTIFIED REGISTERED

## 2017-11-29 PROCEDURE — 88305 TISSUE EXAM BY PATHOLOGIST: CPT | Performed by: INTERNAL MEDICINE

## 2017-11-29 RX ORDER — PROMETHAZINE HYDROCHLORIDE 25 MG/1
25 SUPPOSITORY RECTAL ONCE AS NEEDED
Status: DISCONTINUED | OUTPATIENT
Start: 2017-11-29 | End: 2017-11-29 | Stop reason: HOSPADM

## 2017-11-29 RX ORDER — LIDOCAINE HYDROCHLORIDE 10 MG/ML
INJECTION, SOLUTION INFILTRATION; PERINEURAL AS NEEDED
Status: DISCONTINUED | OUTPATIENT
Start: 2017-11-29 | End: 2017-11-29 | Stop reason: SURG

## 2017-11-29 RX ORDER — ONDANSETRON 2 MG/ML
4 INJECTION INTRAMUSCULAR; INTRAVENOUS ONCE AS NEEDED
Status: DISCONTINUED | OUTPATIENT
Start: 2017-11-29 | End: 2017-11-29 | Stop reason: HOSPADM

## 2017-11-29 RX ORDER — PROMETHAZINE HYDROCHLORIDE 25 MG/1
25 TABLET ORAL ONCE AS NEEDED
Status: DISCONTINUED | OUTPATIENT
Start: 2017-11-29 | End: 2017-11-29 | Stop reason: HOSPADM

## 2017-11-29 RX ORDER — MINOCYCLINE HYDROCHLORIDE 50 MG/1
50 CAPSULE ORAL 2 TIMES DAILY
Qty: 60 CAPSULE | Refills: 2 | Status: SHIPPED | OUTPATIENT
Start: 2017-11-29 | End: 2018-02-15 | Stop reason: SDUPTHER

## 2017-11-29 RX ORDER — PROPOFOL 10 MG/ML
VIAL (ML) INTRAVENOUS AS NEEDED
Status: DISCONTINUED | OUTPATIENT
Start: 2017-11-29 | End: 2017-11-29 | Stop reason: SURG

## 2017-11-29 RX ORDER — DEXAMETHASONE SODIUM PHOSPHATE 4 MG/ML
8 INJECTION, SOLUTION INTRA-ARTICULAR; INTRALESIONAL; INTRAMUSCULAR; INTRAVENOUS; SOFT TISSUE ONCE AS NEEDED
Status: DISCONTINUED | OUTPATIENT
Start: 2017-11-29 | End: 2017-11-29 | Stop reason: HOSPADM

## 2017-11-29 RX ORDER — PROMETHAZINE HYDROCHLORIDE 25 MG/ML
12.5 INJECTION, SOLUTION INTRAMUSCULAR; INTRAVENOUS ONCE AS NEEDED
Status: DISCONTINUED | OUTPATIENT
Start: 2017-11-29 | End: 2017-11-29 | Stop reason: HOSPADM

## 2017-11-29 RX ORDER — MIDAZOLAM HYDROCHLORIDE 1 MG/ML
INJECTION INTRAMUSCULAR; INTRAVENOUS AS NEEDED
Status: DISCONTINUED | OUTPATIENT
Start: 2017-11-29 | End: 2017-11-29 | Stop reason: SURG

## 2017-11-29 RX ORDER — DEXTROSE AND SODIUM CHLORIDE 5; .45 G/100ML; G/100ML
30 INJECTION, SOLUTION INTRAVENOUS CONTINUOUS PRN
Status: DISCONTINUED | OUTPATIENT
Start: 2017-11-29 | End: 2017-11-29 | Stop reason: HOSPADM

## 2017-11-29 RX ADMIN — PROPOFOL 100 MG: 10 INJECTION, EMULSION INTRAVENOUS at 14:20

## 2017-11-29 RX ADMIN — DEXTROSE AND SODIUM CHLORIDE 30 ML/HR: 5; 450 INJECTION, SOLUTION INTRAVENOUS at 13:41

## 2017-11-29 RX ADMIN — LIDOCAINE HYDROCHLORIDE 100 MG: 10 INJECTION, SOLUTION INFILTRATION; PERINEURAL at 14:20

## 2017-11-29 RX ADMIN — MIDAZOLAM 2 MG: 1 INJECTION INTRAMUSCULAR; INTRAVENOUS at 14:15

## 2017-11-29 RX ADMIN — PROPOFOL 50 MG: 10 INJECTION, EMULSION INTRAVENOUS at 14:29

## 2017-11-29 RX ADMIN — PROPOFOL 50 MG: 10 INJECTION, EMULSION INTRAVENOUS at 14:24

## 2017-11-29 RX ADMIN — PROPOFOL 50 MG: 10 INJECTION, EMULSION INTRAVENOUS at 14:26

## 2017-11-29 NOTE — TELEPHONE ENCOUNTER
-Requested Refills Sent    ---- Message from Yecenia Cedeño sent at 11/29/2017  4:50 PM CST -----  Contact: Violet his mom  Left ph message saying he needs refill for Minocycline Dr [facility]nez pt    [phone]

## 2017-11-29 NOTE — ANESTHESIA POSTPROCEDURE EVALUATION
Patient: Sean De Souza    Procedure Summary     Date Anesthesia Start Anesthesia Stop Room / Location    11/29/17 1411 [id number] ENDOSCOPY 1 / [facility] ENDOSCOPY       Procedure Diagnosis Surgeon Provider    ESOPHAGOGASTRODUODENOSCOPY (us prior @1115) (N/A Esophagus); COLONOSCOPY (N/A ) Nausea; Generalized abdominal pain; Encounter for diagnostic colonoscopy due to change in bowel habits  (Nausea [R11.0]; Generalized abdominal pain [R10.84]; Encounter for diagnostic colonoscopy due to change in bowel habits [R19.4]) MD Ashley Harris, MAREK          Anesthesia Type: MAC  Last vitals  BP   136/86 (11/29/17 1330)   Temp   98.8 °F (37.1 °C) (11/29/17 1330)   Pulse   102 (11/29/17 1330)   Resp   18 (11/29/17 1330)     SpO2   100 % (11/29/17 1330)     Post Anesthesia Care and Evaluation    Patient location during evaluation: bedside  Patient participation: complete - patient participated  Level of consciousness: sleepy but conscious  Pain score: 0  Pain management: adequate  Airway patency: patent  Anesthetic complications: No anesthetic complications  PONV Status: none  Cardiovascular status: acceptable  Respiratory status: acceptable  Hydration status: acceptable

## 2017-11-29 NOTE — ANESTHESIA PREPROCEDURE EVALUATION
Anesthesia Evaluation     Patient summary reviewed and Nursing notes reviewed   NPO Solid Status: > 8 hours  NPO Liquid Status: > 6 hours     Airway   Mallampati: II  TM distance: >3 FB  Neck ROM: full  Dental - normal exam     Pulmonary - normal exam   Cardiovascular - normal exam        Neuro/Psych  (+) headaches, psychiatric history Anxiety,    GI/Hepatic/Renal/Endo      Musculoskeletal     Abdominal  - normal exam   Substance History      OB/GYN          Other                                        Anesthesia Plan    ASA 2     MAC     intravenous induction   Anesthetic plan and risks discussed with patient.

## 2017-11-30 ENCOUNTER — TELEPHONE (OUTPATIENT)
Dept: GASTROENTEROLOGY | Facility: CLINIC | Age: 22
End: 2017-11-30

## 2017-11-30 RX ORDER — MINOCYCLINE HYDROCHLORIDE 50 MG/1
CAPSULE ORAL
Qty: 60 CAPSULE | OUTPATIENT
Start: 2017-11-30

## 2017-11-30 NOTE — TELEPHONE ENCOUNTER
----- Message from REDDY Griggs sent at 11/30/2017  8:11 AM CST -----  Please call patient with results.

## 2017-11-30 NOTE — TELEPHONE ENCOUNTER
Attempted to call patient in regards to his results for his ultrasound of abdomen complete. Patient did not answer therefore I left a message identifying who I was and where I was calling from. I also left a call back number for patient to reach me at his earliest convenience.

## 2017-12-06 ENCOUNTER — OFFICE VISIT (OUTPATIENT)
Dept: GASTROENTEROLOGY | Facility: CLINIC | Age: 22
End: 2017-12-06

## 2017-12-06 VITALS
DIASTOLIC BLOOD PRESSURE: 72 MMHG | WEIGHT: 197 LBS | BODY MASS INDEX: 26.11 KG/M2 | HEIGHT: 73 IN | SYSTOLIC BLOOD PRESSURE: 118 MMHG | HEART RATE: 103 BPM

## 2017-12-06 DIAGNOSIS — K29.70 GASTRITIS WITHOUT BLEEDING, UNSPECIFIED CHRONICITY, UNSPECIFIED GASTRITIS TYPE: ICD-10-CM

## 2017-12-06 DIAGNOSIS — K21.00 GASTROESOPHAGEAL REFLUX DISEASE WITH ESOPHAGITIS: Primary | ICD-10-CM

## 2017-12-06 PROCEDURE — 99214 OFFICE O/P EST MOD 30 MIN: CPT | Performed by: NURSE PRACTITIONER

## 2017-12-06 RX ORDER — HYOSCYAMINE SULFATE 0.125 MG
0.12 TABLET ORAL EVERY 6 HOURS PRN
Qty: 60 TABLET | Refills: 11 | Status: SHIPPED | OUTPATIENT
Start: 2017-12-06 | End: 2018-11-14

## 2017-12-06 RX ORDER — OMEPRAZOLE 40 MG/1
40 CAPSULE, DELAYED RELEASE ORAL DAILY
Qty: 30 CAPSULE | Refills: 11 | Status: SHIPPED | OUTPATIENT
Start: 2017-12-06

## 2017-12-06 NOTE — PROGRESS NOTES
Chief Complaint   Patient presents with   • Colonoscopy     f/u   • EGD     f/u       Subjective    Sean Fox Oxford is a 22 y.o. male. he is being seen for Follow-up    History of Present Illness  Patient is a 22-year-old male presents For EGD and colonoscopy results.  He was initially seen due to  abdominal pain, diarrhea and nausea.  States abdominal pain has improved recently and now bowel movements have regulated to about once per day with no visible blood.  He reports intermittent left upper quadrant pain described as a burning.    EGD noted gastritis, normal esophagus and normal duodenum.  Biopsies are consistent with mild chronic gastritis, negative for H. pylori.  Esophagus noted segments of mildly inflamed stratified squamous epithelium.  In the duodenum also shows mild chronic inflammation on biopsy.  Colonoscopy had a fair prep and internal and external hemorrhoids were found.  Random colonic biopsy shows no acute abnormality.  Ultrasound abdomen was negative.  CBC and CMP are unremarkable.  Patient states diarrhea improves and he did not collect the stool.  Plan; start patient on Prilosec 40 mg per day.  Discontinue Bentyl and start patient on Levsin as needed for abdominal cramping.  Follow-up in 6 months return to office sooner if needed.       The following portions of the patient's history were reviewed and updated as appropriate:   Past Medical History:   Diagnosis Date   • Attention deficit hyperactivity disorder    • Elevated blood pressure reading     without diagnosis of hypertension   • Encounter for screening for infections with a predominantly sexual mode of transmission    • Fatigue    • Generalized anxiety disorder    • Sinus headache    • Sore throat      Past Surgical History:   Procedure Laterality Date   • APPENDECTOMY N/A 2/17/2017    Procedure: APPENDECTOMY LAPAROSCOPIC ;  Surgeon: Jamil John MD;  Location: [facility];  Service:    • COLONOSCOPY N/A 11/29/2017     Procedure: COLONOSCOPY;  Surgeon: Steven Helms MD;  Location: [facility] ENDOSCOPY;  Service:    • ENDOSCOPY N/A 11/29/2017    Procedure: ESOPHAGOGASTRODUODENOSCOPY (us prior @1115);  Surgeon: Steven Helms MD;  Location: [facility] ENDOSCOPY;  Service:    • EXTERNAL EAR SURGERY       Family History   Problem Relation Age of Onset   • Hypertension Mother    • No Known Problems Father        Current Outpatient Prescriptions   Medication Sig Dispense Refill   • amphetamine-dextroamphetamine XR (ADDERALL XR) 20 MG 24 hr capsule Take 1 capsule by mouth Every Morning. 30 capsule 0   • busPIRone (BUSPAR) 5 MG tablet Take 5 mg by mouth 3 (Three) Times a Day.     • hydrocortisone 2.5 % lotion Apply  topically 2 (Two) Times a Day. Apply to affected area     • loratadine (CLARITIN) 10 MG tablet Take 10 mg by mouth Daily.     • minocycline (MINOCIN,DYNACIN) 50 MG capsule Take 1 capsule by mouth 2 (Two) Times a Day. 60 capsule 2   • ondansetron ODT (ZOFRAN-ODT) 4 MG disintegrating tablet Take 1 tablet by mouth Every 6 (Six) Hours As Needed for Nausea or Vomiting. 12 tablet 0   • hyoscyamine (ANASPAZ,LEVSIN) 0.125 MG tablet Take 1 tablet by mouth Every 6 (Six) Hours As Needed for Cramping. 60 tablet 11   • omeprazole (priLOSEC) 40 MG capsule Take 1 capsule by mouth Daily. 30 capsule 11     No current facility-administered medications for this visit.      Allergies   Allergen Reactions   • Biaxin [Clarithromycin]    • Cefzil [Cefprozil]    • Penicillins      Social History     Social History   • Marital status:      Spouse name: N/A   • Number of children: N/A   • Years of education: N/A     Social History Main Topics   • Smoking status: Never Smoker   • Smokeless tobacco: Never Used   • Alcohol use No   • Drug use: No   • Sexual activity: Defer     Other Topics Concern   • None     Social History Narrative       Review of Systems  Review of Systems   Constitutional: Positive for activity change and fatigue. Negative for  "appetite change, chills, diaphoresis, fever and unexpected weight change.   HENT: Negative for sore throat and trouble swallowing.    Respiratory: Negative for shortness of breath.    Gastrointestinal: Positive for abdominal pain (cramping abdominal pain ). Negative for abdominal distention, anal bleeding, blood in stool, constipation, diarrhea, nausea, rectal pain and vomiting.   Musculoskeletal: Negative for arthralgias.   Skin: Negative for pallor.   Neurological: Negative for light-headedness.        /72 (BP Location: Left arm, Patient Position: Sitting, Cuff Size: Adult)  Pulse 103  Ht 185.4 cm (72.99\")  Wt 89.4 kg (197 lb)  BMI 26 kg/m2    Objective    Physical Exam   Constitutional: He is oriented to person, place, and time. He appears well-developed and well-nourished. He is cooperative. No distress.   HENT:   Head: Normocephalic and atraumatic.   Neck: Normal range of motion. Neck supple. No thyromegaly present.   Cardiovascular: Normal rate, regular rhythm and normal heart sounds.    Pulmonary/Chest: Effort normal and breath sounds normal. He has no wheezes. He has no rhonchi. He has no rales.   Abdominal: Soft. Normal appearance and bowel sounds are normal. He exhibits no shifting dullness and no distension. There is no hepatosplenomegaly. There is no tenderness. There is no rigidity and no guarding. No hernia.   Lymphadenopathy:     He has no cervical adenopathy.   Neurological: He is alert and oriented to person, place, and time.   Skin: Skin is warm, dry and intact. No rash noted. No pallor.   Psychiatric: He has a normal mood and affect. His speech is normal.     Admission on 11/29/2017, Discharged on 11/29/2017   Component Date Value Ref Range Status   • Case Report 11/29/2017    Final                    Value:Surgical Pathology Report                         Case: [id number]                                  Authorizing Provider:  Steven Helms MD         Collected:           11/29/2017 " 02:27 PM          Ordering Location:     [facility]             Received:            11/29/2017 02:59 PM                                 [place] [facility]                                                     Pathologist:           Barron Cohen MD                                                         Specimens:   1) - Gastric, Antrum, antrum bx                                                                     2) - Small Intestine, Duodenum, small bowel bx                                                      3) - Esophagus, Distal, distal esophagus bx                                                         4) - Large Intestine, colonic mucosa bx                                                   • Final Diagnosis 11/29/2017    Final                    Value:This result contains rich text formatting which cannot be displayed here.   • Gross Description 11/29/2017    Final                    Value:This result contains rich text formatting which cannot be displayed here.     Assessment/Plan      1. Gastroesophageal reflux disease with esophagitis    2. Gastritis without bleeding, unspecified chronicity, unspecified gastritis type    .       Review and/or summary of lab tests, radiology, procedures, medications. Review and summary of old records and obtaining of history. The risks and benefits of my recommendations, as well as other treatment options were discussed with the patient today. Questions were answered.    New Medications Ordered This Visit   Medications   • omeprazole (priLOSEC) 40 MG capsule     Sig: Take 1 capsule by mouth Daily.     Dispense:  30 capsule     Refill:  11   • hyoscyamine (ANASPAZ,LEVSIN) 0.125 MG tablet     Sig: Take 1 tablet by mouth Every 6 (Six) Hours As Needed for Cramping.     Dispense:  60 tablet     Refill:  11       Follow-up: Return in about 6 months (around 6/6/2018).          This document has been electronically signed by REDDY Bhatia on December 6, 2017  2:52 PM             Results for orders placed or performed during the hospital encounter of 11/29/17   Tissue Pathology Exam - Tissue, Gastric, Antrum   Result Value Ref Range    Case Report       Surgical Pathology Report                         Case: [id number]                                  Authorizing Provider:  Steven Helms MD         Collected:           11/29/2017 02:27 PM          Ordering Location:     [facility]             Received:            11/29/2017 02:59 PM                                 [place] [facility]                                                     Pathologist:           Barron Cohen MD                                                         Specimens:   1) - Gastric, Antrum, antrum bx                                                                     2) - Small Intestine, Duodenum, small bowel bx                                                      3) - Esophagus, Distal, distal esophagus bx                                                         4) - Large Intestine, colonic mucosa bx                                                    Final Diagnosis       1.  GASTRIC ANTRUM, MUCOSAL BIOPSY:  MILD CHRONIC GASTRITIS.  NO EVIDENCE OF HELICOBACTER PYLORI.    2.  DUODENUM, MUCOSAL BIOPSY:  MILD CHRONIC INFLAMMATION.    3.  DISTAL ESOPHAGUS, MUCOSAL BIOPSY:  SEGMENTS OF MILDLY INFLAMED STRATIFIED SQUAMOUS EPITHELIUM.    4.  COLONIC MUCOSAL BIOPSY, RANDOM:  NO SIGNIFICANT PATHOLOGIC DIAGNOSIS.      Gross Description       In 4 containers, each of these show mucosal biopsies measuring up to 0.3 cm in greatest dimension.  Embedded accordingly.  1A antrum; 2A duodenum; 3A distal esophagus; 4A colonic mucosa.      Embedded Images     Results for orders placed or performed in visit on 10/31/17   IBD Sgi Diagnostic   Result Value Ref Range    Specimen Status Comment    CBC Auto Differential   Result Value Ref Range    WBC 5.36 3.20 - 9.80 10*3/mm3    RBC 5.47 4.37 - 5.74  10*6/mm3    Hemoglobin 15.4 13.7 - 17.3 g/dL    Hematocrit 46.0 39.0 - 49.0 %    MCV 84.1 80.0 - 98.0 fL    MCH 28.2 26.5 - 34.0 pg    MCHC 33.5 31.5 - 36.3 g/dL    RDW 13.6 11.5 - 14.5 %    RDW-SD 41.8 35.1 - 43.9 fl    MPV 9.4 8.0 - 12.0 fL    Platelets 276 150 - 450 10*3/mm3    Neutrophil % 54.4 37.0 - 80.0 %    Lymphocyte % 39.4 10.0 - 50.0 %    Monocyte % 4.3 0.0 - 12.0 %    Eosinophil % 1.1 0.0 - 7.0 %    Basophil % 0.6 0.0 - 2.0 %    Immature Grans % 0.2 0.0 - 0.5 %    Neutrophils, Absolute 2.92 2.00 - 8.60 10*3/mm3    Lymphocytes, Absolute 2.11 0.60 - 4.20 10*3/mm3    Monocytes, Absolute 0.23 0.00 - 0.90 10*3/mm3    Eosinophils, Absolute 0.06 0.00 - 0.70 10*3/mm3    Basophils, Absolute 0.03 0.00 - 0.20 10*3/mm3    Immature Grans, Absolute 0.01 0.00 - 0.02 10*3/mm3   Comprehensive Metabolic Panel   Result Value Ref Range    Glucose 84 60 - 100 mg/dL    BUN 10 7 - 21 mg/dL    Creatinine 0.85 0.70 - 1.30 mg/dL    Sodium 141 137 - 145 mmol/L    Potassium 4.4 3.5 - 5.1 mmol/L    Chloride 98 95 - 110 mmol/L    CO2 28.0 22.0 - 31.0 mmol/L    Calcium 10.3 (H) 8.4 - 10.2 mg/dL    Total Protein 8.1 6.3 - 8.6 g/dL    Albumin 4.80 3.40 - 4.80 g/dL    ALT (SGPT) 35 21 - 72 U/L    AST (SGOT) 37 17 - 59 U/L    Alkaline Phosphatase 37 (L) 38 - 126 U/L    Total Bilirubin 0.6 0.2 - 1.3 mg/dL    eGFR Non  Amer 115 77 - 179 mL/min/1.73    Globulin 3.3 2.3 - 3.5 gm/dL    A/G Ratio 1.5 1.1 - 1.8 g/dL    BUN/Creatinine Ratio 11.8 7.0 - 25.0    Anion Gap 15.0 5.0 - 15.0 mmol/L   Results for orders placed or performed during the hospital encounter of 02/23/17   POCT Rapid Strep A   Result Value Ref Range    Rapid Strep A Screen Negative Negative, VALID, INVALID, Not Performed    Internal Control Passed Passed    Lot Number [id number]     Expiration Date 2/18    Results for orders placed or performed during the hospital encounter of 02/17/17   Gold Top - SST   Result Value Ref Range    Extra Tube Hold for add-ons.    Green Top  (Gel)   Result Value Ref Range    Extra Tube Hold for add-ons.    Urinalysis With / Culture If Indicated   Result Value Ref Range    Color, UA Yellow Yellow, Straw, Dark Yellow, Vale    Appearance, UA Clear Clear    pH, UA 5.5 5.0 - 9.0    Specific Gravity, UA 1.029 1.003 - 1.030    Glucose, UA Negative Negative    Ketones, UA Negative Negative    Bilirubin, UA Negative Negative    Blood, UA Negative Negative    Protein, UA Negative Negative    Leuk Esterase, UA Negative Negative    Nitrite, UA Negative Negative    Urobilinogen, UA 0.2 E.U./dL 0.2 - 1.0 E.U./dL   CBC Auto Differential   Result Value Ref Range    WBC 9.24 3.20 - 9.80 10*3/mm3    RBC 4.97 4.37 - 5.74 10*6/mm3    Hemoglobin 14.1 13.7 - 17.3 g/dL    Hematocrit 41.8 39.0 - 49.0 %    MCV 84.1 80.0 - 98.0 fL    MCH 28.4 26.5 - 34.0 pg    MCHC 33.7 31.5 - 36.3 g/dL    RDW 14.1 11.5 - 14.5 %    RDW-SD 43.3 35.1 - 43.9 fl    MPV 9.4 8.0 - 12.0 fL    Platelets 240 150 - 450 10*3/mm3    Neutrophil % 62.6 37.0 - 80.0 %    Lymphocyte % 27.5 10.0 - 50.0 %    Monocyte % 8.4 0.0 - 12.0 %    Eosinophil % 1.3 0.0 - 7.0 %    Basophil % 0.1 0.0 - 2.0 %    Immature Grans % 0.1 0.0 - 0.5 %    Neutrophils, Absolute 5.78 2.00 - 8.60 10*3/mm3    Lymphocytes, Absolute 2.54 0.60 - 4.20 10*3/mm3    Monocytes, Absolute 0.78 0.00 - 0.90 10*3/mm3    Eosinophils, Absolute 0.12 0.00 - 0.70 10*3/mm3    Basophils, Absolute 0.01 0.00 - 0.20 10*3/mm3    Immature Grans, Absolute 0.01 0.00 - 0.02 10*3/mm3   Lavender Top   Result Value Ref Range    Extra Tube hold for add-on    Light Blue Top   Result Value Ref Range    Extra Tube hold for add-on    Tissue Exam   Result Value Ref Range    Case Report       Surgical Pathology Report                         Case: [id number]                                  Authorizing Provider:  Jamil John MD Collected:           02/17/2017 01:32 PM          Ordering Location:     [facility]             Received:            02/17/2017 01:58  "[facility] OR                                                              Pathologist:           Mark Rahman MD                                                            Specimen:    Large Intestine, Appendix                                                                  Final Diagnosis       VERMIFORM APPENDIX:      ACUTE APPENDICITIS.   MESENTERIC LYMPH NODE:      NO SIGNIFICANT HISTOLOGIC ABNORMALITY.      Gross Description       The specimen is labeled \"appendix\".  This measures 7.0 cm in length and up to 1.2 cm in diameter.  The serosal surface shows capillary congestion.  The staple lines are trimmed and the appendix is serially sectioned and entirely submitted in 3 blocks.      Embedded Images     POC Glucose Fingerstick   Result Value Ref Range    Glucose 230 (H) 70 - 130 mg/dL   Lipase   Result Value Ref Range    Lipase 63 25 - 110 U/L   Comprehensive Metabolic Panel   Result Value Ref Range    Glucose 91 60 - 100 mg/dL    BUN 11 7 - 21 mg/dL    Creatinine 0.79 0.70 - 1.30 mg/dL    Sodium 142 137 - 145 mmol/L    Potassium 3.8 3.5 - 5.1 mmol/L    Chloride 101 95 - 110 mmol/L    CO2 30.0 22.0 - 31.0 mmol/L    Calcium 9.2 8.4 - 10.2 mg/dL    Total Protein 7.0 6.3 - 8.6 g/dL    Albumin 4.10 3.40 - 4.80 g/dL    ALT (SGPT) 25 21 - 72 U/L    AST (SGOT) 27 17 - 59 U/L    Alkaline Phosphatase 40 (L) 65 - 260 U/L    Total Bilirubin 0.7 0.2 - 1.3 mg/dL    eGFR Non  Amer 126 77 - 179 mL/min/1.73    Globulin 2.9 2.3 - 3.5 gm/dL    A/G Ratio 1.4 1.1 - 1.8 g/dL    BUN/Creatinine Ratio 13.9 7.0 - 25.0    Anion Gap 11.0 5.0 - 15.0 mmol/L     *Note: Due to a large number of results and/or encounters for the requested time period, some results have not been displayed. A complete set of results can be found in Results Review.       "

## 2017-12-06 NOTE — PATIENT INSTRUCTIONS
Food Choices for Gastroesophageal Reflux Disease, Adult  When you have gastroesophageal reflux disease (GERD), the foods you eat and your eating habits are very important. Choosing the right foods can help ease the discomfort of GERD.  WHAT GENERAL GUIDELINES DO I NEED TO FOLLOW?  · Choose fruits, vegetables, whole grains, low-fat dairy products, and low-fat meat, fish, and poultry.  · Limit fats such as oils, salad dressings, butter, nuts, and avocado.  · Keep a food diary to identify foods that cause symptoms.  · Avoid foods that cause reflux. These may be different for different people.  · Eat frequent small meals instead of three large meals each day.  · Eat your meals slowly, in a relaxed setting.  · Limit fried foods.  · Cook foods using methods other than frying.  · Avoid drinking alcohol.  · Avoid drinking large amounts of liquids with your meals.  · Avoid bending over or lying down until 2-3 hours after eating.  WHAT FOODS ARE NOT RECOMMENDED?  The following are some foods and drinks that may worsen your symptoms:  Vegetables  Tomatoes. Tomato juice. Tomato and spaghetti sauce. Chili peppers. Onion and garlic. Horseradish.  Fruits  Oranges, grapefruit, and lemon (fruit and juice).  Meats  High-fat meats, fish, and poultry. This includes hot dogs, ribs, ham, sausage, salami, and burrows.  Dairy  Whole milk and chocolate milk. Sour cream. Cream. Butter. Ice cream. Cream cheese.   Beverages  Coffee and tea, with or without caffeine. Carbonated beverages or energy drinks.  Condiments  Hot sauce. Barbecue sauce.   Sweets/Desserts  Chocolate and cocoa. Donuts. Peppermint and spearmint.  Fats and Oils  High-fat foods, including French fries and potato chips.  Other  Vinegar. Strong spices, such as black pepper, white pepper, red pepper, cayenne, ramsey powder, cloves, ginger, and chili powder.  The items listed above may not be a complete list of foods and beverages to avoid. Contact your dietitian for more  information.     This information is not intended to replace advice given to you by your health care provider. Make sure you discuss any questions you have with your health care provider.     Document Released: 12/18/2006 Document Revised: 01/08/2016 Document Reviewed: 10/22/2014  [employer] Interactive Patient Education ©2017 [employer]    Gastroesophageal Reflux Disease, Adult  Normally, food travels down the esophagus and stays in the stomach to be digested. If a person has gastroesophageal reflux disease (GERD), food and stomach acid move back up into the esophagus. When this happens, the esophagus becomes sore and swollen (inflamed). Over time, GERD can make small holes (ulcers) in the lining of the esophagus.  HOME CARE  Diet   · Follow a diet as told by your doctor. You may need to avoid foods and drinks such as:    Coffee and tea (with or without caffeine).    Drinks that contain alcohol.    Energy drinks and sports drinks.    Carbonated drinks or sodas.    Chocolate and cocoa.    Peppermint and mint flavorings.    Garlic and onions.    Horseradish.    Spicy and acidic foods, such as peppers, chili powder, ramsey powder, vinegar, hot sauces, and BBQ sauce.    Citrus fruit juices and citrus fruits, such as oranges, debbie, and limes.    Tomato-based foods, such as red sauce, chili, salsa, and pizza with red sauce.    Fried and fatty foods, such as donuts, french fries, potato chips, and high-fat dressings.    High-fat meats, such as hot dogs, rib eye steak, sausage, ham, and burrows.    High-fat dairy items, such as whole milk, butter, and cream cheese.  · Eat small meals often. Avoid eating large meals.  · Avoid drinking large amounts of liquid with your meals.  · Avoid eating meals during the 2-3 hours before bedtime.  · Avoid lying down right after you eat.  · Do not exercise right after you eat.   General Instructions   · Pay attention to any changes in your symptoms.  · Take over-the-counter and  prescription medicines only as told by your doctor. Do not take aspirin, ibuprofen, or other NSAIDs unless your doctor says it is okay.  · Do not use any tobacco products, including cigarettes, chewing tobacco, and e-cigarettes. If you need help quitting, ask your doctor.  · Wear loose clothes. Do not wear anything tight around your waist.  · Raise (elevate) the head of your bed about 6 inches (15 cm).  · Try to lower your stress. If you need help doing this, ask your doctor.  · If you are overweight, lose an amount of weight that is healthy for you. Ask your doctor about a safe weight loss goal.  · Keep all follow-up visits as told by your doctor. This is important.  GET HELP IF:  · You have new symptoms.  · You lose weight and you do not know why it is happening.  · You have trouble swallowing, or it hurts to swallow.  · You have wheezing or a cough that keeps happening.  · Your symptoms do not get better with treatment.  · You have a hoarse voice.  GET HELP RIGHT AWAY IF:  · You have pain in your arms, neck, jaw, teeth, or back.  · You feel sweaty, dizzy, or light-headed.  · You have chest pain or shortness of breath.  · You throw up (vomit) and your throw up looks like blood or coffee grounds.  · You pass out (faint).  · Your poop (stool) is bloody or black.  · You cannot swallow, drink, or eat.     This information is not intended to replace advice given to you by your health care provider. Make sure you discuss any questions you have with your health care provider.     Document Released: 06/05/2009 Document Revised: 09/07/2016 Document Reviewed: 04/13/2016  [employer] Interactive Patient Education ©2017 [employer]

## 2018-02-15 ENCOUNTER — TELEPHONE (OUTPATIENT)
Dept: FAMILY MEDICINE CLINIC | Facility: CLINIC | Age: 23
End: 2018-02-15

## 2018-02-15 ENCOUNTER — APPOINTMENT (OUTPATIENT)
Dept: LAB | Facility: HOSPITAL | Age: 23
End: 2018-02-15

## 2018-02-15 ENCOUNTER — OFFICE VISIT (OUTPATIENT)
Dept: FAMILY MEDICINE CLINIC | Facility: CLINIC | Age: 23
End: 2018-02-15

## 2018-02-15 VITALS
DIASTOLIC BLOOD PRESSURE: 76 MMHG | HEIGHT: 73 IN | BODY MASS INDEX: 27.17 KG/M2 | SYSTOLIC BLOOD PRESSURE: 140 MMHG | WEIGHT: 205 LBS | HEART RATE: 83 BPM | OXYGEN SATURATION: 99 %

## 2018-02-15 DIAGNOSIS — R03.0 ELEVATED BLOOD PRESSURE READING: ICD-10-CM

## 2018-02-15 DIAGNOSIS — F90.0 ADHD, PREDOMINANTLY INATTENTIVE TYPE: ICD-10-CM

## 2018-02-15 DIAGNOSIS — F32.89 OTHER DEPRESSION: Primary | ICD-10-CM

## 2018-02-15 LAB
T4 FREE SERPL-MCNC: 0.99 NG/DL (ref 0.78–2.19)
TSH SERPL DL<=0.05 MIU/L-ACNC: 1.24 MIU/ML (ref 0.46–4.68)

## 2018-02-15 PROCEDURE — 99213 OFFICE O/P EST LOW 20 MIN: CPT | Performed by: FAMILY MEDICINE

## 2018-02-15 PROCEDURE — 36415 COLL VENOUS BLD VENIPUNCTURE: CPT | Performed by: FAMILY MEDICINE

## 2018-02-15 PROCEDURE — 84439 ASSAY OF FREE THYROXINE: CPT | Performed by: FAMILY MEDICINE

## 2018-02-15 PROCEDURE — 84443 ASSAY THYROID STIM HORMONE: CPT | Performed by: FAMILY MEDICINE

## 2018-02-15 RX ORDER — BUSPIRONE HYDROCHLORIDE 5 MG/1
5 TABLET ORAL 3 TIMES DAILY
Qty: 30 TABLET | Refills: 5 | Status: SHIPPED | OUTPATIENT
Start: 2018-02-15 | End: 2020-03-30 | Stop reason: SDUPTHER

## 2018-02-15 RX ORDER — DEXTROAMPHETAMINE SACCHARATE, AMPHETAMINE ASPARTATE MONOHYDRATE, DEXTROAMPHETAMINE SULFATE AND AMPHETAMINE SULFATE 5; 5; 5; 5 MG/1; MG/1; MG/1; MG/1
20 CAPSULE, EXTENDED RELEASE ORAL EVERY MORNING
Qty: 30 CAPSULE | Refills: 0 | Status: SHIPPED | OUTPATIENT
Start: 2018-02-15 | End: 2018-02-15 | Stop reason: SDUPTHER

## 2018-02-15 RX ORDER — DEXTROAMPHETAMINE SACCHARATE, AMPHETAMINE ASPARTATE MONOHYDRATE, DEXTROAMPHETAMINE SULFATE AND AMPHETAMINE SULFATE 5; 5; 5; 5 MG/1; MG/1; MG/1; MG/1
20 CAPSULE, EXTENDED RELEASE ORAL EVERY MORNING
Qty: 30 CAPSULE | Refills: 0 | Status: SHIPPED | OUTPATIENT
Start: 2018-02-15 | End: 2018-04-18 | Stop reason: SDUPTHER

## 2018-02-15 RX ORDER — MINOCYCLINE HYDROCHLORIDE 50 MG/1
50 CAPSULE ORAL EVERY 12 HOURS SCHEDULED
Qty: 60 CAPSULE | Refills: 2 | Status: SHIPPED | OUTPATIENT
Start: 2018-02-15 | End: 2018-11-14

## 2018-02-15 RX ORDER — LORATADINE 10 MG/1
10 TABLET ORAL DAILY
Qty: 30 TABLET | Refills: 5 | Status: SHIPPED | OUTPATIENT
Start: 2018-02-15 | End: 2018-11-14

## 2018-02-15 NOTE — PATIENT INSTRUCTIONS
"DASH Eating Plan  DASH stands for \"Dietary Approaches to Stop Hypertension.\" The DASH eating plan is a healthy eating plan that has been shown to reduce high blood pressure (hypertension). It may also reduce your risk for type 2 diabetes, heart disease, and stroke. The DASH eating plan may also help with weight loss.  What are tips for following this plan?  General guidelines   · Avoid eating more than 2,300 mg (milligrams) of salt (sodium) a day. If you have hypertension, you may need to reduce your sodium intake to 1,500 mg a day.  · Limit alcohol intake to no more than 1 drink a day for nonpregnant women and 2 drinks a day for men. One drink equals 12 oz of beer, 5 oz of wine, or 1½ oz of hard liquor.  · Work with your health care provider to maintain a healthy body weight or to lose weight. Ask what an ideal weight is for you.  · Get at least 30 minutes of exercise that causes your heart to beat faster (aerobic exercise) most days of the week. Activities may include walking, swimming, or biking.  · Work with your health care provider or diet and nutrition specialist (dietitian) to adjust your eating plan to your individual calorie needs.  Reading food labels   · Check food labels for the amount of sodium per serving. Choose foods with less than 5 percent of the Daily Value of sodium. Generally, foods with less than 300 mg of sodium per serving fit into this eating plan.  · To find whole grains, look for the word \"whole\" as the first word in the ingredient list.  Shopping   · Buy products labeled as \"low-sodium\" or \"no salt added.\"  · Buy fresh foods. Avoid canned foods and premade or frozen meals.  Cooking   · Avoid adding salt when cooking. Use salt-free seasonings or herbs instead of table salt or sea salt. Check with your health care provider or pharmacist before using salt substitutes.  · Do not carias foods. Cook foods using healthy methods such as baking, boiling, grilling, and broiling instead.  · Cook with " heart-healthy oils, such as olive, canola, soybean, or sunflower oil.  Meal planning     · Eat a balanced diet that includes:  ¨ 5 or more servings of fruits and vegetables each day. At each meal, try to fill half of your plate with fruits and vegetables.  ¨ Up to 6-8 servings of whole grains each day.  ¨ Less than 6 oz of lean meat, poultry, or fish each day. A 3-oz serving of meat is about the same size as a deck of cards. One egg equals 1 oz.  ¨ 2 servings of low-fat dairy each day.  ¨ A serving of nuts, seeds, or beans 5 times each week.  ¨ Heart-healthy fats. Healthy fats called Omega-3 fatty acids are found in foods such as flaxseeds and coldwater fish, like sardines, salmon, and mackerel.  · Limit how much you eat of the following:  ¨ Canned or prepackaged foods.  ¨ Food that is high in trans fat, such as fried foods.  ¨ Food that is high in saturated fat, such as fatty meat.  ¨ Sweets, desserts, sugary drinks, and other foods with added sugar.  ¨ Full-fat dairy products.  · Do not salt foods before eating.  · Try to eat at least 2 vegetarian meals each week.  · Eat more home-cooked food and less restaurant, buffet, and fast food.  · When eating at a restaurant, ask that your food be prepared with less salt or no salt, if possible.  What foods are recommended?  The items listed may not be a complete list. Talk with your dietitian about what dietary choices are best for you.  Grains   Whole-grain or whole-wheat bread. Whole-grain or whole-wheat pasta. Brown rice. Oatmeal. Quinoa. Bulgur. Whole-grain and low-sodium cereals. Hannah bread. Low-fat, low-sodium crackers. Whole-wheat flour tortillas.  Vegetables   Fresh or frozen vegetables (raw, steamed, roasted, or grilled). Low-sodium or reduced-sodium tomato and vegetable juice. Low-sodium or reduced-sodium tomato sauce and tomato paste. Low-sodium or reduced-sodium canned vegetables.  Fruits   All fresh, dried, or frozen fruit. Canned fruit in natural juice  (without added sugar).  Meat and other protein foods   Skinless chicken or turkey. Ground chicken or turkey. Pork with fat trimmed off. Fish and seafood. Egg whites. Dried beans, peas, or lentils. Unsalted nuts, nut butters, and seeds. Unsalted canned beans. Lean cuts of beef with fat trimmed off. Low-sodium, lean deli meat.  Dairy   Low-fat (1%) or fat-free (skim) milk. Fat-free, low-fat, or reduced-fat cheeses. Nonfat, low-sodium ricotta or cottage cheese. Low-fat or nonfat yogurt. Low-fat, low-sodium cheese.  Fats and oils   Soft margarine without trans fats. Vegetable oil. Low-fat, reduced-fat, or light mayonnaise and salad dressings (reduced-sodium). Canola, safflower, olive, soybean, and sunflower oils. Avocado.  Seasoning and other foods   Herbs. Spices. Seasoning mixes without salt. Unsalted popcorn and pretzels. Fat-free sweets.  What foods are not recommended?  The items listed may not be a complete list. Talk with your dietitian about what dietary choices are best for you.  Grains   Baked goods made with fat, such as croissants, muffins, or some breads. Dry pasta or rice meal packs.  Vegetables   Creamed or fried vegetables. Vegetables in a cheese sauce. Regular canned vegetables (not low-sodium or reduced-sodium). Regular canned tomato sauce and paste (not low-sodium or reduced-sodium). Regular tomato and vegetable juice (not low-sodium or reduced-sodium). Pickles. Olives.  Fruits   Canned fruit in a light or heavy syrup. Fried fruit. Fruit in cream or butter sauce.  Meat and other protein foods   Fatty cuts of meat. Ribs. Fried meat. Quintanilla. Sausage. Bologna and other processed lunch meats. Salami. Fatback. Hotdogs. Bratwurst. Salted nuts and seeds. Canned beans with added salt. Canned or smoked fish. Whole eggs or egg yolks. Chicken or turkey with skin.  Dairy   Whole or 2% milk, cream, and half-and-half. Whole or full-fat cream cheese. Whole-fat or sweetened yogurt. Full-fat cheese. Nondairy creamers.  Whipped toppings. Processed cheese and cheese spreads.  Fats and oils   Butter. Stick margarine. Lard. Shortening. Ghee. Quintanilla fat. Tropical oils, such as coconut, palm kernel, or palm oil.  Seasoning and other foods   Salted popcorn and pretzels. Onion salt, garlic salt, seasoned salt, table salt, and sea salt. Worcestershire sauce. Tartar sauce. Barbecue sauce. Teriyaki sauce. Soy sauce, including reduced-sodium. Steak sauce. Canned and packaged gravies. Fish sauce. Oyster sauce. Cocktail sauce. Horseradish that you find on the shelf. Ketchup. Mustard. Meat flavorings and tenderizers. Bouillon cubes. Hot sauce and Tabasco sauce. Premade or packaged marinades. Premade or packaged taco seasonings. Relishes. Regular salad dressings.  Where to find more information:  · National Heart, Lung, and Blood [place]: www.nhlbi.nih.gov  · American Heart Association: www.heart.org  Summary  · The DASH eating plan is a healthy eating plan that has been shown to reduce high blood pressure (hypertension). It may also reduce your risk for type 2 diabetes, heart disease, and stroke.  · With the DASH eating plan, you should limit salt (sodium) intake to 2,300 mg a day. If you have hypertension, you may need to reduce your sodium intake to 1,500 mg a day.  · When on the DASH eating plan, aim to eat more fresh fruits and vegetables, whole grains, lean proteins, low-fat dairy, and heart-healthy fats.  · Work with your health care provider or diet and nutrition specialist (dietitian) to adjust your eating plan to your individual calorie needs.  This information is not intended to replace advice given to you by your health care provider. Make sure you discuss any questions you have with your health care provider.  Document Released: 12/06/2012 Document Revised: 12/11/2017 Document Reviewed: 12/11/2017  [employer] Patient Education © 2017 [employer]

## 2018-02-15 NOTE — PROGRESS NOTES
"Ricardo De Souza is a 22 y.o. male.   compliance with medication regimen  often fidgets with hands or feet or squirms in seat - no  - no, often runs about or climbs excessively in situations in which it is inappropriate or feel restless - No ,  , is often \"on the go\" or often acts as if \"driven by a motor\" - No  and often talks excessively - No loss of concentration - yes    often blurts out answers before questions have been completed - No , often has difficulty awaiting turn - No  and often interrupts or intrudes on others - No     easliy distracted and trouble concentrating especially at end of dose      Depression   Visit Type: follow-up  Patient presents with the following symptoms: depressed mood, irritability and nervousness/anxiety.  Patient is not experiencing: insomnia.         The following portions of the patient's history were reviewed and updated as appropriate: allergies, past family history, past medical history, past social history, past surgical history and problem list.    Review of Systems   Constitutional: Positive for irritability. Negative for unexpected weight change.   Psychiatric/Behavioral: Positive for sleep disturbance. The patient is nervous/anxious. The patient does not have insomnia.        Objective   Physical Exam   Constitutional: He is oriented to person, place, and time. He appears well-developed and well-nourished. No distress.   HENT:   Head: Normocephalic and atraumatic.   Neurological: He is alert and oriented to person, place, and time. No cranial nerve deficit or sensory deficit.   Reflex Scores:       Patellar reflexes are 2+ on the right side and 2+ on the left side.  Skin: Skin is warm and dry. He is not diaphoretic.   Psychiatric: He has a normal mood and affect. His speech is normal and behavior is normal. Judgment and thought content normal.   Nursing note and vitals reviewed.      Assessment/Plan   Problems Addressed this Visit        Cardiovascular " and Mediastinum    Elevated blood pressure reading       Other    ADHD, predominantly inattentive type    Relevant Medications    amphetamine-dextroamphetamine XR (ADDERALL XR) 20 MG 24 hr capsule      Other Visit Diagnoses     Other depression    -  Primary    Relevant Medications    amphetamine-dextroamphetamine XR (ADDERALL XR) 20 MG 24 hr capsule    Other Relevant Orders    T4, Free    TSH    Ambulatory Referral to Psychiatry

## 2018-02-16 ENCOUNTER — TELEPHONE (OUTPATIENT)
Dept: FAMILY MEDICINE CLINIC | Facility: CLINIC | Age: 23
End: 2018-02-16

## 2018-02-16 NOTE — PROGRESS NOTES
Pr Dr. Martínez, . [place] as been called with his recent lab results & recommendations.  Continue his current medications and follow-up as planned or sooner if any problems.

## 2018-02-16 NOTE — TELEPHONE ENCOUNTER
-Pr Dr. Martínez, Mr. De Souza as been called with his recent lab results & recommendations.  Continue his current medications and follow-up as planned or sooner if any problems.      ---- Message from Bridger Martínez MD sent at 2/16/2018  8:23 AM CST -----  Ok, call or send card.

## 2018-03-16 ENCOUNTER — OFFICE VISIT (OUTPATIENT)
Dept: FAMILY MEDICINE CLINIC | Facility: CLINIC | Age: 23
End: 2018-03-16

## 2018-03-16 VITALS
WEIGHT: 204 LBS | DIASTOLIC BLOOD PRESSURE: 72 MMHG | OXYGEN SATURATION: 98 % | BODY MASS INDEX: 27.04 KG/M2 | SYSTOLIC BLOOD PRESSURE: 124 MMHG | HEART RATE: 88 BPM | HEIGHT: 73 IN

## 2018-03-16 DIAGNOSIS — J01.00 ACUTE NON-RECURRENT MAXILLARY SINUSITIS: Primary | ICD-10-CM

## 2018-03-16 LAB
EXPIRATION DATE: NORMAL
FLUAV AG NPH QL: NORMAL
FLUBV AG NPH QL: NORMAL
INTERNAL CONTROL: NORMAL
Lab: NORMAL

## 2018-03-16 PROCEDURE — 87804 INFLUENZA ASSAY W/OPTIC: CPT | Performed by: FAMILY MEDICINE

## 2018-03-16 PROCEDURE — 99213 OFFICE O/P EST LOW 20 MIN: CPT | Performed by: FAMILY MEDICINE

## 2018-03-16 RX ORDER — SULFAMETHOXAZOLE AND TRIMETHOPRIM 800; 160 MG/1; MG/1
1 TABLET ORAL
Qty: 14 TABLET | Refills: 0 | Status: SHIPPED | OUTPATIENT
Start: 2018-03-16 | End: 2018-03-23

## 2018-03-16 RX ORDER — GUAIFENESIN 600 MG/1
1200 TABLET, EXTENDED RELEASE ORAL 2 TIMES DAILY
Qty: 40 TABLET | Refills: 0 | Status: SHIPPED | OUTPATIENT
Start: 2018-03-16 | End: 2018-03-26

## 2018-03-16 NOTE — PROGRESS NOTES
Ricardo De Souza is a 22 y.o. male.     Sinusitis   This is a new problem. The current episode started in the past 7 days. The problem has been waxing and waning since onset. There has been no fever. The pain is moderate. Associated symptoms include congestion, sinus pressure and sneezing. Pertinent negatives include no chills, coughing or sore throat.        The following portions of the patient's history were reviewed and updated as appropriate: allergies, current medications, past family history, past medical history, past social history, past surgical history and problem list.    Review of Systems   Constitutional: Negative for chills.   HENT: Positive for congestion, sinus pressure and sneezing. Negative for sore throat.    Respiratory: Negative for cough.        Objective   Physical Exam   Constitutional: He is oriented to person, place, and time. He appears well-developed and well-nourished. No distress.   HENT:   Head: Normocephalic and atraumatic.   Nose: Right sinus exhibits maxillary sinus tenderness and frontal sinus tenderness. Left sinus exhibits no maxillary sinus tenderness and no frontal sinus tenderness.   Mouth/Throat: Uvula is midline, oropharynx is clear and moist and mucous membranes are normal.   Pulmonary/Chest: Effort normal and breath sounds normal. He has no wheezes. He has no rales. He exhibits no tenderness.   Lymphadenopathy:        Head (right side): No submental and no submandibular adenopathy present.        Head (left side): No submental and no submandibular adenopathy present.     He has no cervical adenopathy.   Neurological: He is alert and oriented to person, place, and time.   Skin: Skin is warm and dry. He is not diaphoretic.   Psychiatric: He has a normal mood and affect. His behavior is normal. Judgment and thought content normal.   Nursing note and vitals reviewed.      Assessment/Plan   Problems Addressed this Visit     None      Visit Diagnoses     Acute  non-recurrent maxillary sinusitis    -  Primary

## 2018-04-18 ENCOUNTER — TELEPHONE (OUTPATIENT)
Dept: FAMILY MEDICINE CLINIC | Facility: CLINIC | Age: 23
End: 2018-04-18

## 2018-04-18 RX ORDER — DEXTROAMPHETAMINE SACCHARATE, AMPHETAMINE ASPARTATE MONOHYDRATE, DEXTROAMPHETAMINE SULFATE AND AMPHETAMINE SULFATE 5; 5; 5; 5 MG/1; MG/1; MG/1; MG/1
20 CAPSULE, EXTENDED RELEASE ORAL EVERY MORNING
Qty: 30 CAPSULE | Refills: 0 | Status: SHIPPED | OUTPATIENT
Start: 2018-04-18 | End: 2018-05-16 | Stop reason: SDUPTHER

## 2018-04-18 NOTE — TELEPHONE ENCOUNTER
APPLE HAAS HAS CALLED AND SAID HIS RX FOR amphetamine-dextroamphetamine XR (ADDERALL XR) 20 MG 24 hr capsule HAS RUN OUT BEFORE THEY COULD GET THE LAST REFILL ON IT. SHE IS ASKING IF DR BEE CAN WRITE A NEW RX AND HER  THIS MORNING, HE IS OUT OF HIS MED. PLEASE CALL HER [phone]

## 2018-05-16 ENCOUNTER — OFFICE VISIT (OUTPATIENT)
Dept: FAMILY MEDICINE CLINIC | Facility: CLINIC | Age: 23
End: 2018-05-16

## 2018-05-16 VITALS
SYSTOLIC BLOOD PRESSURE: 126 MMHG | BODY MASS INDEX: 27.82 KG/M2 | OXYGEN SATURATION: 99 % | DIASTOLIC BLOOD PRESSURE: 74 MMHG | WEIGHT: 209.9 LBS | HEART RATE: 91 BPM | HEIGHT: 73 IN

## 2018-05-16 DIAGNOSIS — F90.0 ADHD, PREDOMINANTLY INATTENTIVE TYPE: Primary | ICD-10-CM

## 2018-05-16 DIAGNOSIS — F32.89 OTHER DEPRESSION: ICD-10-CM

## 2018-05-16 PROBLEM — F32.A DEPRESSION: Status: ACTIVE | Noted: 2018-05-16

## 2018-05-16 PROCEDURE — 99213 OFFICE O/P EST LOW 20 MIN: CPT | Performed by: FAMILY MEDICINE

## 2018-05-16 RX ORDER — DEXTROAMPHETAMINE SACCHARATE, AMPHETAMINE ASPARTATE MONOHYDRATE, DEXTROAMPHETAMINE SULFATE AND AMPHETAMINE SULFATE 5; 5; 5; 5 MG/1; MG/1; MG/1; MG/1
20 CAPSULE, EXTENDED RELEASE ORAL EVERY MORNING
Qty: 30 CAPSULE | Refills: 0 | Status: SHIPPED | OUTPATIENT
Start: 2018-05-16 | End: 2018-05-16 | Stop reason: SDUPTHER

## 2018-05-16 RX ORDER — DEXTROAMPHETAMINE SACCHARATE, AMPHETAMINE ASPARTATE MONOHYDRATE, DEXTROAMPHETAMINE SULFATE AND AMPHETAMINE SULFATE 5; 5; 5; 5 MG/1; MG/1; MG/1; MG/1
20 CAPSULE, EXTENDED RELEASE ORAL EVERY MORNING
Qty: 30 CAPSULE | Refills: 0 | Status: SHIPPED | OUTPATIENT
Start: 2018-05-16 | End: 2018-07-25 | Stop reason: SDUPTHER

## 2018-05-16 NOTE — PROGRESS NOTES
"Ricardo De Souza is a 23 y.o. male.   compliance with medication regimen  often fidgets with hands or feet or squirms in seat - no  - no, often runs about or climbs excessively in situations in which it is inappropriate or feel restless - No ,  , is often \"on the go\" or often acts as if \"driven by a motor\" - No  and often talks excessively - No loss of concentration - yes    often blurts out answers before questions have been completed - No , often has difficulty awaiting turn - No  and often interrupts or intrudes on others - No           Depression   Visit Type: follow-up  Patient presents with the following symptoms: depressed mood, irritability and nervousness/anxiety.  Patient is not experiencing: insomnia.         The following portions of the patient's history were reviewed and updated as appropriate: allergies, past family history, past medical history, past social history, past surgical history and problem list.    Review of Systems   Constitutional: Positive for irritability. Negative for unexpected weight change.   Psychiatric/Behavioral: Positive for sleep disturbance. The patient is nervous/anxious. The patient does not have insomnia.        Objective   Physical Exam   Constitutional: He is oriented to person, place, and time. He appears well-developed and well-nourished. No distress.   HENT:   Head: Normocephalic and atraumatic.   Neurological: He is alert and oriented to person, place, and time. No cranial nerve deficit or sensory deficit.   Reflex Scores:       Patellar reflexes are 2+ on the right side and 2+ on the left side.  Skin: Skin is warm and dry. He is not diaphoretic.   Psychiatric: He has a normal mood and affect. His speech is normal and behavior is normal. Judgment and thought content normal.   Nursing note and vitals reviewed.      Assessment/Plan   Problems Addressed this Visit        Other    ADHD, predominantly inattentive type - Primary    Relevant Medications    " amphetamine-dextroamphetamine XR (ADDERALL XR) 20 MG 24 hr capsule    Other Relevant Orders    Ambulatory Referral to Psychology    Depression    Relevant Medications    amphetamine-dextroamphetamine XR (ADDERALL XR) 20 MG 24 hr capsule    Other Relevant Orders    Ambulatory Referral to Psychology

## 2018-05-18 ENCOUNTER — TELEPHONE (OUTPATIENT)
Dept: FAMILY MEDICINE CLINIC | Facility: CLINIC | Age: 23
End: 2018-05-18

## 2018-05-18 NOTE — TELEPHONE ENCOUNTER
Spoke with patient's grandmother. Relayed information that we have not heard back from the insurance company on the PA and that Dr. Martínez had already left for the weekend, so I could not get another Rx of another medication for him to cover the weekend.  Lost connection with grandmother.

## 2018-05-18 NOTE — TELEPHONE ENCOUNTER
----- Message from Yecenia Cedeño sent at 5/18/2018  8:54 AM CDT -----  Contact: [facility]  Said they went to get his Adderall filled and they said insurance did not approve and he is out.  Needs something today or Adderall approved with insurance if possible.    [phone]

## 2018-05-22 NOTE — TELEPHONE ENCOUNTER
FRANCISCA JUDD IS ASKING IF YOU HAVE GOTTEN THE P.A. THRU ON THE ADDERALL? IF NOT HE IS RUNNING OUT AND NEEDS TO GET SOMETHING..PLEASE CALL AND LEAVE MESSAGE AT GRANDMOTHER'S  [phone]

## 2018-07-25 RX ORDER — DEXTROAMPHETAMINE SACCHARATE, AMPHETAMINE ASPARTATE MONOHYDRATE, DEXTROAMPHETAMINE SULFATE AND AMPHETAMINE SULFATE 5; 5; 5; 5 MG/1; MG/1; MG/1; MG/1
20 CAPSULE, EXTENDED RELEASE ORAL EVERY MORNING
Qty: 30 CAPSULE | Refills: 0 | Status: SHIPPED | OUTPATIENT
Start: 2018-07-25 | End: 2018-08-16

## 2018-08-16 ENCOUNTER — OFFICE VISIT (OUTPATIENT)
Dept: FAMILY MEDICINE CLINIC | Facility: CLINIC | Age: 23
End: 2018-08-16

## 2018-08-16 VITALS
HEIGHT: 73 IN | HEART RATE: 79 BPM | OXYGEN SATURATION: 100 % | BODY MASS INDEX: 25.98 KG/M2 | DIASTOLIC BLOOD PRESSURE: 72 MMHG | WEIGHT: 196 LBS | SYSTOLIC BLOOD PRESSURE: 124 MMHG

## 2018-08-16 DIAGNOSIS — F90.0 ADHD, PREDOMINANTLY INATTENTIVE TYPE: Primary | ICD-10-CM

## 2018-08-16 PROCEDURE — 99213 OFFICE O/P EST LOW 20 MIN: CPT | Performed by: FAMILY MEDICINE

## 2018-08-16 RX ORDER — DEXTROAMPHETAMINE SACCHARATE, AMPHETAMINE ASPARTATE MONOHYDRATE, DEXTROAMPHETAMINE SULFATE AND AMPHETAMINE SULFATE 5; 5; 5; 5 MG/1; MG/1; MG/1; MG/1
20 CAPSULE, EXTENDED RELEASE ORAL EVERY MORNING
Qty: 30 CAPSULE | Refills: 0 | Status: SHIPPED | OUTPATIENT
Start: 2018-08-16 | End: 2018-09-15

## 2018-08-16 RX ORDER — DEXTROAMPHETAMINE SACCHARATE, AMPHETAMINE ASPARTATE MONOHYDRATE, DEXTROAMPHETAMINE SULFATE AND AMPHETAMINE SULFATE 5; 5; 5; 5 MG/1; MG/1; MG/1; MG/1
20 CAPSULE, EXTENDED RELEASE ORAL EVERY MORNING
Qty: 30 CAPSULE | Refills: 0 | Status: SHIPPED | OUTPATIENT
Start: 2018-10-16 | End: 2018-10-25 | Stop reason: SDUPTHER

## 2018-08-16 RX ORDER — DEXTROAMPHETAMINE SACCHARATE, AMPHETAMINE ASPARTATE MONOHYDRATE, DEXTROAMPHETAMINE SULFATE AND AMPHETAMINE SULFATE 5; 5; 5; 5 MG/1; MG/1; MG/1; MG/1
20 CAPSULE, EXTENDED RELEASE ORAL EVERY MORNING
Qty: 30 CAPSULE | Refills: 0 | Status: SHIPPED | OUTPATIENT
Start: 2018-09-16 | End: 2018-10-16

## 2018-08-16 NOTE — PROGRESS NOTES
"Ricardo De Souza is a 23 y.o. male.   compliance with medication regimen  often fidgets with hands or feet or squirms in seat - no  - no, often runs about or climbs excessively in situations in which it is inappropriate or feel restless - No ,  , is often \"on the go\" or often acts as if \"driven by a motor\" - No  and often talks excessively - No loss of concentration - yes    often blurts out answers before questions have been completed - No , often has difficulty awaiting turn - No  and often interrupts or intrudes on others - No           Depression   Visit Type: follow-up  Patient presents with the following symptoms: irritability.  Patient is not experiencing: depressed mood, insomnia and nervousness/anxiety.         The following portions of the patient's history were reviewed and updated as appropriate: allergies, past family history, past medical history, past social history, past surgical history and problem list.    Review of Systems   Constitutional: Positive for irritability. Negative for unexpected weight change.   Psychiatric/Behavioral: Positive for sleep disturbance. The patient is not nervous/anxious and does not have insomnia.        Objective   Physical Exam   Constitutional: He is oriented to person, place, and time. He appears well-developed and well-nourished. No distress.   HENT:   Head: Normocephalic and atraumatic.   Neurological: He is alert and oriented to person, place, and time. No cranial nerve deficit or sensory deficit.   Reflex Scores:       Patellar reflexes are 2+ on the right side and 2+ on the left side.  Skin: Skin is warm and dry. He is not diaphoretic.   Psychiatric: He has a normal mood and affect. His speech is normal and behavior is normal. Judgment and thought content normal.   Nursing note and vitals reviewed.      Assessment/Plan   Problems Addressed this Visit        Other    ADHD, predominantly inattentive type - Primary    Relevant Medications    " amphetamine-dextroamphetamine XR (ADDERALL XR) 20 MG 24 hr capsule    amphetamine-dextroamphetamine XR (ADDERALL XR) 20 MG 24 hr capsule (Start on 9/16/2018)    amphetamine-dextroamphetamine XR (ADDERALL XR) 20 MG 24 hr capsule (Start on 10/16/2018)

## 2018-09-06 ENCOUNTER — OFFICE VISIT (OUTPATIENT)
Dept: BEHAVIORAL HEALTH | Facility: CLINIC | Age: 23
End: 2018-09-06

## 2018-09-06 DIAGNOSIS — F90.0 ATTENTION DEFICIT HYPERACTIVITY DISORDER (ADHD), PREDOMINANTLY INATTENTIVE TYPE: Primary | ICD-10-CM

## 2018-09-06 DIAGNOSIS — F41.1 GENERALIZED ANXIETY DISORDER: ICD-10-CM

## 2018-09-06 DIAGNOSIS — F33.1 MODERATE EPISODE OF RECURRENT MAJOR DEPRESSIVE DISORDER (HCC): ICD-10-CM

## 2018-09-06 PROCEDURE — 90791 PSYCH DIAGNOSTIC EVALUATION: CPT | Performed by: PSYCHOLOGIST

## 2018-09-06 NOTE — PROGRESS NOTES
9/6/2018    Sean De Souza, a 23 y.o. male, was seen today for initial appointment lasting 45 minutes.  Patient is referred by Bridger Martínez MD seen today in the company of his wife.     SUBJECTIVE:  Patient states that he's been having issues with moodiness.  He's happy sometimes and sad other times which is been going on about 6 months.  Little things upset him, then he becomes sad and he withdrawals for an hour or a day.  He is able to identify no precipitating event.  This young man's been  for 1 year he has 1 child and one on the way.  He's working at [employer].  When he was in grade school he was diagnosed with ADHD and started on stimulant medication which she currently takes.  He also takes BuSpar for anxiety    FAMILY HISTORY:  Positive for ADHD    Patient was born and raised here in [place].  His childhood was without abuse or mistreatment.  In school he was always in trouble but once he got started on stimulant medication things would allow better.  Graduated [employer] school in 2015, he has some college but quit.    MENTAL STATUS:  Patient presents as a young man who looks his stated age.  Oriented ×3, thoughts are goal-directed and logical.  There is no evidence of a substance abuse disorder or a personality disorder.  He states that he tends to be scatterbrained, he starts things without finishing them, his mind tends to wander when he is trying to concentrate, people complain that he doesn't pay attention to them, he blurts out what's on his mind, he interrupts people and conversations, he is forgetful, and he is a procrastinator.  He sleeps well at night, he has good energy during the day.  He does have episodes of brief depression.  He has anxiety, rare panic attacks, in public he is uncomfortable.  He is baugh, he is irritable, his anger problem.  When he gets angry he screams and yells.  He has difficulty handling changes and transitions.  He likes her  routine.  Does not have OCD characteristics    DIAGNOSIS:    ICD-10-CM ICD-9-CM   1. Attention deficit hyperactivity disorder (ADHD), predominantly inattentive type F90.0 314.00   2. Generalized anxiety disorder F41.1 300.02   3. Moderate episode of recurrent major depressive disorder (CMS/HCC) F33.1 296.32       ASSESSMENT PLAN:  Plan is to evaluate for mood disorder and reevaluate the ADHD he and his wife were given [facility] rating scales and I'll see him for testing with the Daniela computerized continuous performance test          This document has been electronically signed by Drew Rodriguez EdD on September 6, 2018 5:23 PM

## 2018-10-01 ENCOUNTER — OFFICE VISIT (OUTPATIENT)
Dept: BEHAVIORAL HEALTH | Facility: CLINIC | Age: 23
End: 2018-10-01

## 2018-10-01 DIAGNOSIS — F90.0 ATTENTION DEFICIT HYPERACTIVITY DISORDER (ADHD), PREDOMINANTLY INATTENTIVE TYPE: Primary | ICD-10-CM

## 2018-10-01 DIAGNOSIS — F41.1 GENERALIZED ANXIETY DISORDER: ICD-10-CM

## 2018-10-01 DIAGNOSIS — F33.1 MODERATE EPISODE OF RECURRENT MAJOR DEPRESSIVE DISORDER (HCC): ICD-10-CM

## 2018-10-01 PROCEDURE — 90834 PSYTX W PT 45 MINUTES: CPT | Performed by: PSYCHOLOGIST

## 2018-10-02 NOTE — PROGRESS NOTES
10/2/2018    Sean De Souza, a 23 y.o. male, was seen today for a psychological evaluation lasting 45 minutes.  Patient is referred by Bridger Martínez MD seen today in the company of his wife.     SUBJECTIVE:  Patient states that he's been having issues with moodiness.  He's happy sometimes and sad other times which is been going on about 6 months.  Little things upset him, then he becomes sad and he withdrawals for an hour or a day.  He is able to identify no precipitating event.  This young man's been  for 1 year he has 1 child and one on the way.  He's working at [employer].  When he was in grade school he was diagnosed with ADHD and started on stimulant medication which she currently takes.  He also takes BuSpar for anxiety    FAMILY HISTORY:  Positive for ADHD    Patient was born and raised here in [facility].  His childhood was without abuse or mistreatment.  In school he was always in trouble but once he got started on stimulant medication things would allow better.  Graduated [employer] in 2015, he has some college but quit.    MENTAL STATUS:  Patient presents as a young man who looks his stated age.  Oriented ×3, thoughts are goal-directed and logical.  There is no evidence of a substance abuse disorder or a personality disorder.  He states that he tends to be scatterbrained, he starts things without finishing them, his mind tends to wander when he is trying to concentrate, people complain that he doesn't pay attention to them, he blurts out what's on his mind, he interrupts people and conversations, he is forgetful, and he is a procrastinator.  He sleeps well at night, he has good energy during the day.  He does have episodes of brief depression.  He has anxiety, rare panic attacks, in public he is uncomfortable.  He is baugh, he is irritable, his anger problem.  When he gets angry he screams and yells.  He has difficulty handling changes and transitions.  He likes  her routine.  Does not have OCD characteristics    This evaluation consisted of psychological testing with the Daniela computerized continuous performance test and the [facility] adult rating scale completed by the patient's wife.  The results of the computer test of attention are better than expected because the patient took his Adderall the morning before the test.  However, even with the Adderall's scores were positive for significant problems with attention.  The Hondo requires the patient to click a mouse button for certain visual and auditory targets displayed on the computer.  In addition, the patient has to refrain from clicking on the mouse button for visual and auditory distractor non-targets.  Scores on the Hondo have a mean of 100 and a standard deviation of 15 points, any score of 80 or lower identifies a significant problem area.  The patient's scores were impulsivity 92, attention 78, hyperactivity 110.  The test results show that the patient made errors by not responding when a response was called for, indicating inattention.    On the [facility] questionnaire the patient's wife reported significant symptoms for inattention, hyperactivity, impulsivity.  In addition she also reported high levels of anxiety, depression, and frequent mood changes.  Wife reported that this patient is easily distracted, has difficulty sustaining attention, has trouble listening when others are talking, has difficulty following through with instructions, has difficulty keeping in the area organized, a tendency to lose things, a tendency to be forgetful, easily bored, and unmotivated.  She also reported that he worries excessively and that he tends to be oppositional and argumentative.  She reported that he has a tendency to get locked into negative thoughts, and he has a dislike of change.  She reported periods of quick temper or rages and he has a tendency to misinterpret comments has negative.  She states that he can be  explosive then apologizes and seems to be tired.  Several times she reported that he tends to have cyclic mood changes.  At other times he has periods of increased talkativeness.  The patient's [facility] clinic rating scale had been lost soul was unavailable for this evaluation.    This patient clearly meets diagnostic criteria for attention deficit hyperactivity disorder.  He also has a mood disorder characterized by anxiety, depression and mood instability.  In the past stimulants have worked well for this patient, however antidepressants seem to make him more irritable.  I think this patient would be a good candidate for a trial of a mood stabilizing medication in addition to his stimulant medication.        DIAGNOSIS:    ICD-10-CM ICD-9-CM   1. Attention deficit hyperactivity disorder (ADHD), predominantly inattentive type F90.0 314.00   2. Moderate episode of recurrent major depressive disorder (CMS/HCC) F33.1 296.32   3. Generalized anxiety disorder F41.1 300.02       ASSESSMENT PLAN:   Recommendations are for this patient to see his physician for continued treatment of his ADHD with stimulant medication and consideration of treatment for his mood instability.          This document has been electronically signed by Drew Rodriguez EdD on October 2, 2018 8:17 AM

## 2018-10-25 ENCOUNTER — TELEPHONE (OUTPATIENT)
Dept: FAMILY MEDICINE CLINIC | Facility: CLINIC | Age: 23
End: 2018-10-25

## 2018-10-25 RX ORDER — DEXTROAMPHETAMINE SACCHARATE, AMPHETAMINE ASPARTATE MONOHYDRATE, DEXTROAMPHETAMINE SULFATE AND AMPHETAMINE SULFATE 5; 5; 5; 5 MG/1; MG/1; MG/1; MG/1
20 CAPSULE, EXTENDED RELEASE ORAL EVERY MORNING
Qty: 30 CAPSULE | Refills: 0 | Status: SHIPPED | OUTPATIENT
Start: 2018-10-25 | End: 2018-11-14 | Stop reason: SDUPTHER

## 2018-11-14 ENCOUNTER — OFFICE VISIT (OUTPATIENT)
Dept: FAMILY MEDICINE CLINIC | Facility: CLINIC | Age: 23
End: 2018-11-14

## 2018-11-14 VITALS
WEIGHT: 192.2 LBS | SYSTOLIC BLOOD PRESSURE: 120 MMHG | OXYGEN SATURATION: 98 % | DIASTOLIC BLOOD PRESSURE: 72 MMHG | HEART RATE: 76 BPM | HEIGHT: 73 IN | BODY MASS INDEX: 25.47 KG/M2

## 2018-11-14 DIAGNOSIS — F90.0 ADHD, PREDOMINANTLY INATTENTIVE TYPE: Primary | ICD-10-CM

## 2018-11-14 DIAGNOSIS — R45.4 IRRITABILITY: ICD-10-CM

## 2018-11-14 DIAGNOSIS — F32.89 OTHER DEPRESSION: ICD-10-CM

## 2018-11-14 PROCEDURE — 99213 OFFICE O/P EST LOW 20 MIN: CPT | Performed by: FAMILY MEDICINE

## 2018-11-14 RX ORDER — DEXTROAMPHETAMINE SACCHARATE, AMPHETAMINE ASPARTATE MONOHYDRATE, DEXTROAMPHETAMINE SULFATE AND AMPHETAMINE SULFATE 5; 5; 5; 5 MG/1; MG/1; MG/1; MG/1
20 CAPSULE, EXTENDED RELEASE ORAL EVERY MORNING
Qty: 30 CAPSULE | Refills: 0 | Status: SHIPPED | OUTPATIENT
Start: 2018-11-14 | End: 2018-11-14 | Stop reason: SDUPTHER

## 2018-11-14 RX ORDER — DEXTROAMPHETAMINE SACCHARATE, AMPHETAMINE ASPARTATE MONOHYDRATE, DEXTROAMPHETAMINE SULFATE AND AMPHETAMINE SULFATE 5; 5; 5; 5 MG/1; MG/1; MG/1; MG/1
20 CAPSULE, EXTENDED RELEASE ORAL EVERY MORNING
Qty: 30 CAPSULE | Refills: 0 | Status: SHIPPED | OUTPATIENT
Start: 2018-11-14 | End: 2018-12-14

## 2018-11-14 RX ORDER — DIVALPROEX SODIUM 250 MG/1
250 TABLET, DELAYED RELEASE ORAL 3 TIMES DAILY
Qty: 90 TABLET | Refills: 2 | Status: SHIPPED | OUTPATIENT
Start: 2018-11-14 | End: 2019-02-20 | Stop reason: SDUPTHER

## 2018-11-14 NOTE — PROGRESS NOTES
"Ricardo De Souza is a 23 y.o. male.   compliance with medication regimen  often fidgets with hands or feet or squirms in seat - no  - no, often runs about or climbs excessively in situations in which it is inappropriate or feel restless - No ,  , is often \"on the go\" or often acts as if \"driven by a motor\" - No  and often talks excessively - No loss of concentration - yes    often blurts out answers before questions have been completed - yes, often has difficulty awaiting turn - No  and often interrupts or intrudes on others - No           Depression   Visit Type: follow-up  Patient presents with the following symptoms: irritability.  Patient is not experiencing: depressed mood, insomnia and nervousness/anxiety.         The following portions of the patient's history were reviewed and updated as appropriate: allergies, past family history, past medical history, past social history, past surgical history and problem list.    Review of Systems   Constitutional: Positive for irritability. Negative for unexpected weight change.   Psychiatric/Behavioral: Positive for sleep disturbance. The patient is not nervous/anxious and does not have insomnia.        Objective   Physical Exam   Constitutional: He is oriented to person, place, and time. He appears well-developed and well-nourished. No distress.   HENT:   Head: Normocephalic and atraumatic.   Neurological: He is alert and oriented to person, place, and time. No cranial nerve deficit or sensory deficit.   Reflex Scores:       Patellar reflexes are 2+ on the right side and 2+ on the left side.  Skin: Skin is warm and dry. He is not diaphoretic.   Psychiatric: He has a normal mood and affect. His speech is normal and behavior is normal. Judgment and thought content normal.   Nursing note and vitals reviewed.      Assessment/Plan   Problems Addressed this Visit        Other    ADHD, predominantly inattentive type - Primary    Relevant Medications    " amphetamine-dextroamphetamine XR (ADDERALL XR) 20 MG 24 hr capsule    Depression    Relevant Medications    amphetamine-dextroamphetamine XR (ADDERALL XR) 20 MG 24 hr capsule      Other Visit Diagnoses     Irritability                Trial of depakote as mood stabalizer

## 2019-01-16 ENCOUNTER — OFFICE VISIT (OUTPATIENT)
Dept: FAMILY MEDICINE CLINIC | Facility: CLINIC | Age: 24
End: 2019-01-16

## 2019-01-16 VITALS
OXYGEN SATURATION: 99 % | BODY MASS INDEX: 25.13 KG/M2 | SYSTOLIC BLOOD PRESSURE: 111 MMHG | HEART RATE: 80 BPM | HEIGHT: 73 IN | WEIGHT: 189.6 LBS | DIASTOLIC BLOOD PRESSURE: 68 MMHG

## 2019-01-16 DIAGNOSIS — F32.89 OTHER DEPRESSION: ICD-10-CM

## 2019-01-16 DIAGNOSIS — F90.0 ADHD, PREDOMINANTLY INATTENTIVE TYPE: Primary | ICD-10-CM

## 2019-01-16 DIAGNOSIS — R45.4 IRRITABILITY: ICD-10-CM

## 2019-01-16 PROCEDURE — 99213 OFFICE O/P EST LOW 20 MIN: CPT | Performed by: FAMILY MEDICINE

## 2019-01-16 RX ORDER — DEXTROAMPHETAMINE SACCHARATE, AMPHETAMINE ASPARTATE MONOHYDRATE, DEXTROAMPHETAMINE SULFATE AND AMPHETAMINE SULFATE 5; 5; 5; 5 MG/1; MG/1; MG/1; MG/1
20 CAPSULE, EXTENDED RELEASE ORAL EVERY MORNING
Refills: 0 | COMMUNITY
Start: 2018-12-26 | End: 2019-01-16 | Stop reason: SDUPTHER

## 2019-01-16 RX ORDER — ECHINACEA 400 MG
1 CAPSULE ORAL DAILY
COMMUNITY

## 2019-01-16 RX ORDER — DEXTROAMPHETAMINE SACCHARATE, AMPHETAMINE ASPARTATE MONOHYDRATE, DEXTROAMPHETAMINE SULFATE AND AMPHETAMINE SULFATE 5; 5; 5; 5 MG/1; MG/1; MG/1; MG/1
20 CAPSULE, EXTENDED RELEASE ORAL EVERY MORNING
Qty: 30 CAPSULE | Refills: 0 | Status: SHIPPED | OUTPATIENT
Start: 2019-01-16 | End: 2019-03-14 | Stop reason: SDUPTHER

## 2019-01-16 RX ORDER — DEXTROAMPHETAMINE SACCHARATE, AMPHETAMINE ASPARTATE MONOHYDRATE, DEXTROAMPHETAMINE SULFATE AND AMPHETAMINE SULFATE 5; 5; 5; 5 MG/1; MG/1; MG/1; MG/1
20 CAPSULE, EXTENDED RELEASE ORAL EVERY MORNING
Qty: 30 CAPSULE | Refills: 0 | Status: SHIPPED | OUTPATIENT
Start: 2019-01-16 | End: 2019-01-16 | Stop reason: SDUPTHER

## 2019-01-16 NOTE — PROGRESS NOTES
"Ricardo De Souza is a 23 y.o. male.   compliance with medication regimen  often fidgets with hands or feet or squirms in seat - no  - no, often runs about or climbs excessively in situations in which it is inappropriate or feel restless - No ,  , is often \"on the go\" or often acts as if \"driven by a motor\" - No  and often talks excessively - No loss of concentration - yes    often blurts out answers before questions have been completed - yes, often has difficulty awaiting turn - No  and often interrupts or intrudes on others - No           Depression   Visit Type: follow-up  Patient is not experiencing: depressed mood, insomnia, irritability and nervousness/anxiety.         The following portions of the patient's history were reviewed and updated as appropriate: allergies, past family history, past medical history, past social history, past surgical history and problem list.    Review of Systems   Constitutional: Negative for irritability and unexpected weight change.   Psychiatric/Behavioral: Positive for sleep disturbance. The patient is not nervous/anxious and does not have insomnia.        Objective   Physical Exam   Constitutional: He is oriented to person, place, and time. He appears well-developed and well-nourished. No distress.   HENT:   Head: Normocephalic and atraumatic.   Neurological: He is alert and oriented to person, place, and time. No cranial nerve deficit or sensory deficit.   Reflex Scores:       Patellar reflexes are 2+ on the right side and 2+ on the left side.  Skin: Skin is warm and dry. He is not diaphoretic.   Psychiatric: He has a normal mood and affect. His speech is normal and behavior is normal. Judgment and thought content normal.   Nursing note and vitals reviewed.      Assessment/Plan   Problems Addressed this Visit        Other    ADHD, predominantly inattentive type - Primary    Relevant Medications    amphetamine-dextroamphetamine XR (ADDERALL XR) 20 MG 24 hr " capsule    Depression    Relevant Medications    amphetamine-dextroamphetamine XR (ADDERALL XR) 20 MG 24 hr capsule    Irritability             depakote as mood stabilizer has helped

## 2019-02-21 RX ORDER — DIVALPROEX SODIUM 250 MG/1
TABLET, DELAYED RELEASE ORAL
Qty: 90 TABLET | Refills: 2 | Status: SHIPPED | OUTPATIENT
Start: 2019-02-21 | End: 2019-03-14 | Stop reason: SDUPTHER

## 2019-03-14 ENCOUNTER — OFFICE VISIT (OUTPATIENT)
Dept: FAMILY MEDICINE CLINIC | Facility: CLINIC | Age: 24
End: 2019-03-14

## 2019-03-14 VITALS
HEART RATE: 89 BPM | SYSTOLIC BLOOD PRESSURE: 124 MMHG | DIASTOLIC BLOOD PRESSURE: 70 MMHG | WEIGHT: 188.5 LBS | BODY MASS INDEX: 24.98 KG/M2 | HEIGHT: 73 IN | OXYGEN SATURATION: 99 %

## 2019-03-14 DIAGNOSIS — F32.89 OTHER DEPRESSION: ICD-10-CM

## 2019-03-14 DIAGNOSIS — F90.0 ADHD, PREDOMINANTLY INATTENTIVE TYPE: Primary | ICD-10-CM

## 2019-03-14 PROCEDURE — 99213 OFFICE O/P EST LOW 20 MIN: CPT | Performed by: FAMILY MEDICINE

## 2019-03-14 RX ORDER — DEXTROAMPHETAMINE SACCHARATE, AMPHETAMINE ASPARTATE MONOHYDRATE, DEXTROAMPHETAMINE SULFATE AND AMPHETAMINE SULFATE 5; 5; 5; 5 MG/1; MG/1; MG/1; MG/1
20 CAPSULE, EXTENDED RELEASE ORAL EVERY MORNING
Qty: 30 CAPSULE | Refills: 0 | Status: SHIPPED | OUTPATIENT
Start: 2019-03-14 | End: 2019-03-14 | Stop reason: SDUPTHER

## 2019-03-14 RX ORDER — DIVALPROEX SODIUM 250 MG/1
250 TABLET, DELAYED RELEASE ORAL 3 TIMES DAILY
Qty: 90 TABLET | Refills: 11 | Status: SHIPPED | OUTPATIENT
Start: 2019-03-14 | End: 2020-01-06

## 2019-03-14 RX ORDER — DIVALPROEX SODIUM 250 MG/1
250 TABLET, DELAYED RELEASE ORAL 3 TIMES DAILY
Qty: 90 TABLET | Refills: 2 | Status: SHIPPED | OUTPATIENT
Start: 2019-03-14 | End: 2019-03-14 | Stop reason: SDUPTHER

## 2019-03-14 RX ORDER — DEXTROAMPHETAMINE SACCHARATE, AMPHETAMINE ASPARTATE MONOHYDRATE, DEXTROAMPHETAMINE SULFATE AND AMPHETAMINE SULFATE 5; 5; 5; 5 MG/1; MG/1; MG/1; MG/1
20 CAPSULE, EXTENDED RELEASE ORAL EVERY MORNING
Qty: 30 CAPSULE | Refills: 0 | Status: SHIPPED | OUTPATIENT
Start: 2019-03-14 | End: 2019-05-14 | Stop reason: SDUPTHER

## 2019-03-14 NOTE — PROGRESS NOTES
"Ricardo De Souza is a 23 y.o. male.   compliance with medication regimen  often fidgets with hands or feet or squirms in seat - no  - no, often runs about or climbs excessively in situations in which it is inappropriate or feel restless - No ,  , is often \"on the go\" or often acts as if \"driven by a motor\" - No  and often talks excessively - No loss of concentration - yes    often blurts out answers before questions have been completed - yes, often has difficulty awaiting turn - No  and often interrupts or intrudes on others - No           Depression   Visit Type: follow-up  Patient is not experiencing: depressed mood, insomnia, irritability, nervousness/anxiety and palpitations.         The following portions of the patient's history were reviewed and updated as appropriate: allergies, past family history, past medical history, past social history, past surgical history and problem list.    Review of Systems   Constitutional: Negative for irritability and unexpected weight change.   Cardiovascular: Negative for palpitations.   Psychiatric/Behavioral: Negative for sleep disturbance. The patient is not nervous/anxious and does not have insomnia.        Objective   Physical Exam   Constitutional: He is oriented to person, place, and time. He appears well-developed and well-nourished. No distress.   HENT:   Head: Normocephalic and atraumatic.   Neurological: He is alert and oriented to person, place, and time. No cranial nerve deficit or sensory deficit.   Reflex Scores:       Patellar reflexes are 2+ on the right side and 2+ on the left side.  Skin: Skin is warm and dry. He is not diaphoretic.   Psychiatric: He has a normal mood and affect. His speech is normal and behavior is normal. Judgment and thought content normal.   Nursing note and vitals reviewed.      Assessment/Plan   Problems Addressed this Visit        Other    ADHD, predominantly inattentive type - Primary    Relevant Medications    " amphetamine-dextroamphetamine XR (ADDERALL XR) 20 MG 24 hr capsule    Depression    Relevant Medications    amphetamine-dextroamphetamine XR (ADDERALL XR) 20 MG 24 hr capsule             depakote as mood stabilizer has helped

## 2019-05-14 ENCOUNTER — OFFICE VISIT (OUTPATIENT)
Dept: FAMILY MEDICINE CLINIC | Facility: CLINIC | Age: 24
End: 2019-05-14

## 2019-05-14 VITALS
HEART RATE: 78 BPM | BODY MASS INDEX: 25.31 KG/M2 | WEIGHT: 191 LBS | OXYGEN SATURATION: 100 % | DIASTOLIC BLOOD PRESSURE: 74 MMHG | HEIGHT: 73 IN | SYSTOLIC BLOOD PRESSURE: 120 MMHG

## 2019-05-14 DIAGNOSIS — F90.0 ADHD, PREDOMINANTLY INATTENTIVE TYPE: Primary | ICD-10-CM

## 2019-05-14 DIAGNOSIS — R45.4 IRRITABILITY: ICD-10-CM

## 2019-05-14 DIAGNOSIS — F32.89 OTHER DEPRESSION: ICD-10-CM

## 2019-05-14 PROCEDURE — 99213 OFFICE O/P EST LOW 20 MIN: CPT | Performed by: FAMILY MEDICINE

## 2019-05-14 RX ORDER — DEXTROAMPHETAMINE SACCHARATE, AMPHETAMINE ASPARTATE MONOHYDRATE, DEXTROAMPHETAMINE SULFATE AND AMPHETAMINE SULFATE 5; 5; 5; 5 MG/1; MG/1; MG/1; MG/1
20 CAPSULE, EXTENDED RELEASE ORAL EVERY MORNING
Qty: 30 CAPSULE | Refills: 0 | Status: SHIPPED | OUTPATIENT
Start: 2019-05-14 | End: 2019-07-15 | Stop reason: SDUPTHER

## 2019-05-14 RX ORDER — DEXTROAMPHETAMINE SACCHARATE, AMPHETAMINE ASPARTATE MONOHYDRATE, DEXTROAMPHETAMINE SULFATE AND AMPHETAMINE SULFATE 5; 5; 5; 5 MG/1; MG/1; MG/1; MG/1
20 CAPSULE, EXTENDED RELEASE ORAL EVERY MORNING
Qty: 30 CAPSULE | Refills: 0 | Status: SHIPPED | OUTPATIENT
Start: 2019-05-14 | End: 2019-05-14 | Stop reason: SDUPTHER

## 2019-05-14 NOTE — PROGRESS NOTES
"Ricardo De Souza is a 23 y.o. male.   compliance with medication regimen  often fidgets with hands or feet or squirms in seat - no  - no, often runs about or climbs excessively in situations in which it is inappropriate or feel restless - No ,  , is often \"on the go\" or often acts as if \"driven by a motor\" - No  and often talks excessively - No loss of concentration - yes    often blurts out answers before questions have been completed - yes, often has difficulty awaiting turn - No  and often interrupts or intrudes on others - No           Depression   Visit Type: follow-up  Patient is not experiencing: depressed mood, insomnia, irritability, nervousness/anxiety and palpitations.         The following portions of the patient's history were reviewed and updated as appropriate: allergies, past family history, past medical history, past social history, past surgical history and problem list.    Review of Systems   Constitutional: Negative for irritability and unexpected weight change.   Cardiovascular: Negative for palpitations.   Psychiatric/Behavioral: Negative for sleep disturbance. The patient is not nervous/anxious and does not have insomnia.        Objective   Physical Exam   Constitutional: He is oriented to person, place, and time. He appears well-developed and well-nourished. No distress.   HENT:   Head: Normocephalic and atraumatic.   Neurological: He is alert and oriented to person, place, and time. No cranial nerve deficit or sensory deficit.   Reflex Scores:       Patellar reflexes are 2+ on the right side and 2+ on the left side.  Skin: Skin is warm and dry. He is not diaphoretic.   Psychiatric: He has a normal mood and affect. His speech is normal and behavior is normal. Judgment and thought content normal.   Nursing note and vitals reviewed.      Assessment/Plan   Problems Addressed this Visit        Other    ADHD, predominantly inattentive type - Primary    Relevant Medications    " amphetamine-dextroamphetamine XR (ADDERALL XR) 20 MG 24 hr capsule    Depression    Relevant Medications    amphetamine-dextroamphetamine XR (ADDERALL XR) 20 MG 24 hr capsule    Irritability             depakote as mood stabilizer has helped

## 2019-07-15 ENCOUNTER — OFFICE VISIT (OUTPATIENT)
Dept: FAMILY MEDICINE CLINIC | Facility: CLINIC | Age: 24
End: 2019-07-15

## 2019-07-15 VITALS
HEIGHT: 73 IN | BODY MASS INDEX: 25.76 KG/M2 | DIASTOLIC BLOOD PRESSURE: 76 MMHG | OXYGEN SATURATION: 98 % | HEART RATE: 88 BPM | SYSTOLIC BLOOD PRESSURE: 122 MMHG | WEIGHT: 194.4 LBS

## 2019-07-15 DIAGNOSIS — F90.0 ATTENTION DEFICIT HYPERACTIVITY DISORDER (ADHD), PREDOMINANTLY INATTENTIVE TYPE: ICD-10-CM

## 2019-07-15 DIAGNOSIS — R45.4 IRRITABILITY: Primary | ICD-10-CM

## 2019-07-15 PROBLEM — R19.4 ENCOUNTER FOR DIAGNOSTIC COLONOSCOPY DUE TO CHANGE IN BOWEL HABITS: Status: RESOLVED | Noted: 2017-10-31 | Resolved: 2019-07-15

## 2019-07-15 PROBLEM — R10.84 GENERALIZED ABDOMINAL PAIN: Status: RESOLVED | Noted: 2017-10-31 | Resolved: 2019-07-15

## 2019-07-15 PROBLEM — R11.0 NAUSEA: Status: RESOLVED | Noted: 2017-10-31 | Resolved: 2019-07-15

## 2019-07-15 PROCEDURE — 99213 OFFICE O/P EST LOW 20 MIN: CPT | Performed by: FAMILY MEDICINE

## 2019-07-15 RX ORDER — DEXTROAMPHETAMINE SACCHARATE, AMPHETAMINE ASPARTATE MONOHYDRATE, DEXTROAMPHETAMINE SULFATE AND AMPHETAMINE SULFATE 5; 5; 5; 5 MG/1; MG/1; MG/1; MG/1
20 CAPSULE, EXTENDED RELEASE ORAL EVERY MORNING
Qty: 30 CAPSULE | Refills: 0 | Status: SHIPPED | OUTPATIENT
Start: 2019-07-15 | End: 2019-09-16 | Stop reason: SDUPTHER

## 2019-07-15 RX ORDER — DEXTROAMPHETAMINE SACCHARATE, AMPHETAMINE ASPARTATE MONOHYDRATE, DEXTROAMPHETAMINE SULFATE AND AMPHETAMINE SULFATE 5; 5; 5; 5 MG/1; MG/1; MG/1; MG/1
20 CAPSULE, EXTENDED RELEASE ORAL EVERY MORNING
Qty: 30 CAPSULE | Refills: 0 | Status: SHIPPED | OUTPATIENT
Start: 2019-07-15 | End: 2019-07-15 | Stop reason: SDUPTHER

## 2019-07-15 NOTE — PROGRESS NOTES
"Ricardo De Souza is a 24 y.o. male.   compliance with medication regimen  often fidgets with hands or feet or squirms in seat - no  - no, often runs about or climbs excessively in situations in which it is inappropriate or feel restless - No ,  , is often \"on the go\" or often acts as if \"driven by a motor\" - No  and often talks excessively - No loss of concentration - yes    often blurts out answers before questions have been completed - yes, often has difficulty awaiting turn - No  and often interrupts or intrudes on others - No           Depression   Visit Type: follow-up  Patient is not experiencing: depressed mood, insomnia, irritability, nervousness/anxiety and palpitations.         The following portions of the patient's history were reviewed and updated as appropriate: allergies, past family history, past medical history, past social history, past surgical history and problem list.    Review of Systems   Constitutional: Negative for irritability and unexpected weight change.   Cardiovascular: Negative for palpitations.   Psychiatric/Behavioral: Negative for sleep disturbance. The patient is not nervous/anxious and does not have insomnia.        Objective   Physical Exam   Constitutional: He is oriented to person, place, and time. He appears well-developed and well-nourished. No distress.   HENT:   Head: Normocephalic and atraumatic.   Cardiovascular: Normal rate and regular rhythm.   Neurological: He is alert and oriented to person, place, and time. No cranial nerve deficit or sensory deficit.   Reflex Scores:       Patellar reflexes are 2+ on the right side and 2+ on the left side.  Skin: Skin is warm and dry. He is not diaphoretic.   Psychiatric: He has a normal mood and affect. His speech is normal and behavior is normal. Judgment and thought content normal.   Nursing note and vitals reviewed.      Assessment/Plan   Problems Addressed this Visit        Other    Irritability - Primary    " RESOLVED: Attention deficit hyperactivity disorder    Relevant Medications    amphetamine-dextroamphetamine XR (ADDERALL XR) 20 MG 24 hr capsule             depakote as mood stabilizer has helped

## 2019-07-17 RX ORDER — DIVALPROEX SODIUM 250 MG/1
TABLET, DELAYED RELEASE ORAL
Qty: 270 TABLET | Refills: 0 | Status: SHIPPED | OUTPATIENT
Start: 2019-07-17 | End: 2019-09-16 | Stop reason: SDUPTHER

## 2019-08-07 RX ORDER — OMEPRAZOLE 40 MG/1
CAPSULE, DELAYED RELEASE ORAL
Qty: 90 CAPSULE | Refills: 1 | OUTPATIENT
Start: 2019-08-07

## 2019-09-16 ENCOUNTER — OFFICE VISIT (OUTPATIENT)
Dept: FAMILY MEDICINE CLINIC | Facility: CLINIC | Age: 24
End: 2019-09-16

## 2019-09-16 VITALS
SYSTOLIC BLOOD PRESSURE: 122 MMHG | OXYGEN SATURATION: 100 % | HEIGHT: 73 IN | DIASTOLIC BLOOD PRESSURE: 70 MMHG | BODY MASS INDEX: 26.32 KG/M2 | HEART RATE: 74 BPM | WEIGHT: 198.6 LBS

## 2019-09-16 DIAGNOSIS — F33.1 MODERATE EPISODE OF RECURRENT MAJOR DEPRESSIVE DISORDER (HCC): ICD-10-CM

## 2019-09-16 DIAGNOSIS — F90.0 ADHD, PREDOMINANTLY INATTENTIVE TYPE: Primary | ICD-10-CM

## 2019-09-16 PROCEDURE — 99213 OFFICE O/P EST LOW 20 MIN: CPT | Performed by: FAMILY MEDICINE

## 2019-09-16 RX ORDER — DEXTROAMPHETAMINE SACCHARATE, AMPHETAMINE ASPARTATE MONOHYDRATE, DEXTROAMPHETAMINE SULFATE AND AMPHETAMINE SULFATE 5; 5; 5; 5 MG/1; MG/1; MG/1; MG/1
20 CAPSULE, EXTENDED RELEASE ORAL EVERY MORNING
Qty: 30 CAPSULE | Refills: 0 | Status: SHIPPED | OUTPATIENT
Start: 2019-09-16 | End: 2019-11-18 | Stop reason: SDUPTHER

## 2019-09-16 RX ORDER — DEXTROAMPHETAMINE SACCHARATE, AMPHETAMINE ASPARTATE MONOHYDRATE, DEXTROAMPHETAMINE SULFATE AND AMPHETAMINE SULFATE 5; 5; 5; 5 MG/1; MG/1; MG/1; MG/1
20 CAPSULE, EXTENDED RELEASE ORAL EVERY MORNING
Qty: 30 CAPSULE | Refills: 0 | Status: SHIPPED | OUTPATIENT
Start: 2019-09-16 | End: 2019-09-16 | Stop reason: SDUPTHER

## 2019-09-16 NOTE — PROGRESS NOTES
"Ricardo De Souza is a 24 y.o. male.   compliance with medication regimen  often fidgets with hands or feet or squirms in seat - no  - no, often runs about or climbs excessively in situations in which it is inappropriate or feel restless - No ,  , is often \"on the go\" or often acts as if \"driven by a motor\" - No  and often talks excessively - No loss of concentration - yes    often blurts out answers before questions have been completed - yes, often has difficulty awaiting turn - No  and often interrupts or intrudes on others - No           Depression   Visit Type: follow-up  Patient is not experiencing: depressed mood, insomnia, irritability, nervousness/anxiety and palpitations.         The following portions of the patient's history were reviewed and updated as appropriate: allergies, past family history, past medical history, past social history, past surgical history and problem list.    Review of Systems   Constitutional: Negative for irritability and unexpected weight change.   Cardiovascular: Negative for palpitations.   Psychiatric/Behavioral: Negative for sleep disturbance. The patient is not nervous/anxious and does not have insomnia.        Objective   Physical Exam   Constitutional: He is oriented to person, place, and time. He appears well-developed and well-nourished. No distress.   HENT:   Head: Normocephalic and atraumatic.   Cardiovascular: Normal rate and regular rhythm.   Neurological: He is alert and oriented to person, place, and time. No cranial nerve deficit or sensory deficit.   Reflex Scores:       Patellar reflexes are 2+ on the right side and 2+ on the left side.  Skin: Skin is warm and dry. He is not diaphoretic.   Psychiatric: He has a normal mood and affect. His speech is normal and behavior is normal. Judgment and thought content normal.   Nursing note and vitals reviewed.      Assessment/Plan   Problems Addressed this Visit        Other    ADHD, predominantly " inattentive type - Primary    Relevant Medications    amphetamine-dextroamphetamine XR (ADDERALL XR) 20 MG 24 hr capsule    Depression    Relevant Medications    amphetamine-dextroamphetamine XR (ADDERALL XR) 20 MG 24 hr capsule             depakote as mood stabilizer has helped

## 2019-11-04 RX ORDER — DIVALPROEX SODIUM 250 MG/1
TABLET, DELAYED RELEASE ORAL
Qty: 270 TABLET | Refills: 0 | Status: SHIPPED | OUTPATIENT
Start: 2019-11-04 | End: 2019-11-18 | Stop reason: SDUPTHER

## 2019-11-18 ENCOUNTER — OFFICE VISIT (OUTPATIENT)
Dept: FAMILY MEDICINE CLINIC | Facility: CLINIC | Age: 24
End: 2019-11-18

## 2019-11-18 VITALS
WEIGHT: 209 LBS | HEART RATE: 81 BPM | HEIGHT: 73 IN | SYSTOLIC BLOOD PRESSURE: 124 MMHG | BODY MASS INDEX: 27.7 KG/M2 | DIASTOLIC BLOOD PRESSURE: 70 MMHG | OXYGEN SATURATION: 99 %

## 2019-11-18 DIAGNOSIS — R45.4 IRRITABILITY: ICD-10-CM

## 2019-11-18 DIAGNOSIS — F33.1 MODERATE EPISODE OF RECURRENT MAJOR DEPRESSIVE DISORDER (HCC): ICD-10-CM

## 2019-11-18 DIAGNOSIS — F90.0 ADHD, PREDOMINANTLY INATTENTIVE TYPE: Primary | ICD-10-CM

## 2019-11-18 PROCEDURE — 99213 OFFICE O/P EST LOW 20 MIN: CPT | Performed by: FAMILY MEDICINE

## 2019-11-18 RX ORDER — DEXTROAMPHETAMINE SACCHARATE, AMPHETAMINE ASPARTATE MONOHYDRATE, DEXTROAMPHETAMINE SULFATE AND AMPHETAMINE SULFATE 5; 5; 5; 5 MG/1; MG/1; MG/1; MG/1
20 CAPSULE, EXTENDED RELEASE ORAL EVERY MORNING
Qty: 30 CAPSULE | Refills: 0 | Status: SHIPPED | OUTPATIENT
Start: 2019-11-18 | End: 2020-01-16

## 2019-11-18 RX ORDER — DEXTROAMPHETAMINE SACCHARATE, AMPHETAMINE ASPARTATE MONOHYDRATE, DEXTROAMPHETAMINE SULFATE AND AMPHETAMINE SULFATE 5; 5; 5; 5 MG/1; MG/1; MG/1; MG/1
20 CAPSULE, EXTENDED RELEASE ORAL EVERY MORNING
Qty: 30 CAPSULE | Refills: 0 | Status: SHIPPED | OUTPATIENT
Start: 2019-11-18 | End: 2019-11-18 | Stop reason: SDUPTHER

## 2019-11-18 NOTE — PROGRESS NOTES
"Ricardo De Souza is a 24 y.o. male.   compliance with medication regimen  often fidgets with hands or feet or squirms in seat - no  - no, often runs about or climbs excessively in situations in which it is inappropriate or feel restless - No ,  , is often \"on the go\" or often acts as if \"driven by a motor\" - No  and often talks excessively - No loss of concentration - yes    often blurts out answers before questions have been completed - yes, often has difficulty awaiting turn - No  and often interrupts or intrudes on others - No           Depression   Visit Type: follow-up  Patient is not experiencing: depressed mood, insomnia, irritability, nervousness/anxiety and palpitations.         The following portions of the patient's history were reviewed and updated as appropriate: allergies, past family history, past medical history, past social history, past surgical history and problem list.    Review of Systems   Constitutional: Negative for irritability and unexpected weight change.   Cardiovascular: Negative for palpitations.   Psychiatric/Behavioral: Negative for sleep disturbance. The patient is not nervous/anxious and does not have insomnia.        Objective   Physical Exam   Constitutional: He is oriented to person, place, and time. He appears well-developed and well-nourished. No distress.   HENT:   Head: Normocephalic and atraumatic.   Cardiovascular: Normal rate and regular rhythm.   Neurological: He is alert and oriented to person, place, and time. No cranial nerve deficit or sensory deficit.   Reflex Scores:       Patellar reflexes are 2+ on the right side and 2+ on the left side.  Skin: Skin is warm and dry. He is not diaphoretic.   Psychiatric: He has a normal mood and affect. His speech is normal and behavior is normal. Judgment and thought content normal.   Nursing note and vitals reviewed.      Assessment/Plan   Problems Addressed this Visit        Other    ADHD, predominantly " "inattentive type - Primary    Relevant Medications    amphetamine-dextroamphetamine XR (ADDERALL XR) 20 MG 24 hr capsule    Depression    Relevant Medications    amphetamine-dextroamphetamine XR (ADDERALL XR) 20 MG 24 hr capsule    Irritability             depakote as mood stabilizer has helped        Visit Vitals  /70   Pulse 81   Ht 185.4 cm (73\")   Wt 94.8 kg (209 lb)   SpO2 99%   BMI 27.57 kg/m²       Body mass index is 27.57 kg/m².            This document has been electronically signed by Bridger Martínez MD on November 18, 2019 3:23 PM    "

## 2020-01-06 RX ORDER — DIVALPROEX SODIUM 250 MG/1
TABLET, DELAYED RELEASE ORAL
Qty: 270 TABLET | Refills: 0 | Status: SHIPPED | OUTPATIENT
Start: 2020-01-06 | End: 2020-03-23 | Stop reason: SDUPTHER

## 2020-01-16 ENCOUNTER — OFFICE VISIT (OUTPATIENT)
Dept: FAMILY MEDICINE CLINIC | Facility: CLINIC | Age: 25
End: 2020-01-16

## 2020-01-16 VITALS
SYSTOLIC BLOOD PRESSURE: 118 MMHG | WEIGHT: 205 LBS | HEIGHT: 73 IN | OXYGEN SATURATION: 99 % | BODY MASS INDEX: 27.17 KG/M2 | HEART RATE: 87 BPM | DIASTOLIC BLOOD PRESSURE: 62 MMHG

## 2020-01-16 DIAGNOSIS — F90.0 ADHD, PREDOMINANTLY INATTENTIVE TYPE: Primary | ICD-10-CM

## 2020-01-16 PROCEDURE — 99213 OFFICE O/P EST LOW 20 MIN: CPT | Performed by: FAMILY MEDICINE

## 2020-01-16 RX ORDER — DEXTROAMPHETAMINE SACCHARATE, AMPHETAMINE ASPARTATE MONOHYDRATE, DEXTROAMPHETAMINE SULFATE AND AMPHETAMINE SULFATE 5; 5; 5; 5 MG/1; MG/1; MG/1; MG/1
20 CAPSULE, EXTENDED RELEASE ORAL EVERY MORNING
Qty: 30 CAPSULE | Refills: 0 | Status: SHIPPED | OUTPATIENT
Start: 2020-01-16 | End: 2020-02-15

## 2020-01-16 RX ORDER — DEXTROAMPHETAMINE SACCHARATE, AMPHETAMINE ASPARTATE MONOHYDRATE, DEXTROAMPHETAMINE SULFATE AND AMPHETAMINE SULFATE 5; 5; 5; 5 MG/1; MG/1; MG/1; MG/1
20 CAPSULE, EXTENDED RELEASE ORAL EVERY MORNING
Qty: 30 CAPSULE | Refills: 0 | Status: SHIPPED | OUTPATIENT
Start: 2020-02-13 | End: 2020-03-14

## 2020-01-16 RX ORDER — DEXTROAMPHETAMINE SACCHARATE, AMPHETAMINE ASPARTATE MONOHYDRATE, DEXTROAMPHETAMINE SULFATE AND AMPHETAMINE SULFATE 5; 5; 5; 5 MG/1; MG/1; MG/1; MG/1
20 CAPSULE, EXTENDED RELEASE ORAL EVERY MORNING
Qty: 30 CAPSULE | Refills: 0 | Status: SHIPPED | OUTPATIENT
Start: 2020-03-12 | End: 2020-03-30

## 2020-01-16 NOTE — PROGRESS NOTES
"Ricardo De Souza is a 24 y.o. male.   compliance with medication regimen  often fidgets with hands or feet or squirms in seat - no  - no, often runs about or climbs excessively in situations in which it is inappropriate or feel restless - No ,  , is often \"on the go\" or often acts as if \"driven by a motor\" - No  and often talks excessively - No loss of concentration - yes    often blurts out answers before questions have been completed - yes, often has difficulty awaiting turn - No  and often interrupts or intrudes on others - No           Depression   Visit Type: follow-up  Patient is not experiencing: depressed mood, insomnia, irritability, nervousness/anxiety and palpitations.         The following portions of the patient's history were reviewed and updated as appropriate: allergies, past family history, past medical history, past social history, past surgical history and problem list.    Review of Systems   Constitutional: Negative for irritability and unexpected weight change.   Cardiovascular: Negative for palpitations.   Psychiatric/Behavioral: Negative for sleep disturbance. The patient is not nervous/anxious and does not have insomnia.        Objective   Physical Exam   Constitutional: He is oriented to person, place, and time. He appears well-developed and well-nourished. No distress.   HENT:   Head: Normocephalic and atraumatic.   Cardiovascular: Normal rate and regular rhythm.   Neurological: He is alert and oriented to person, place, and time. No cranial nerve deficit or sensory deficit.   Reflex Scores:       Patellar reflexes are 2+ on the right side and 2+ on the left side.  Skin: Skin is warm and dry. He is not diaphoretic.   Psychiatric: He has a normal mood and affect. His speech is normal and behavior is normal. Judgment and thought content normal.   Nursing note and vitals reviewed.      Assessment/Plan   Problems Addressed this Visit        Other    ADHD, predominantly " "inattentive type - Primary    Relevant Medications    amphetamine-dextroamphetamine XR (ADDERALL XR) 20 MG 24 hr capsule    amphetamine-dextroamphetamine XR (ADDERALL XR) 20 MG 24 hr capsule (Start on 2/13/2020)    amphetamine-dextroamphetamine XR (ADDERALL XR) 20 MG 24 hr capsule (Start on 3/12/2020)             depakote as mood stabilizer has helped        Visit Vitals  /62   Pulse 87   Ht 185.4 cm (73\")   Wt 93 kg (205 lb)   SpO2 99%   BMI 27.05 kg/m²       Body mass index is 27.05 kg/m².            This document has been electronically signed by Bridger Martínez MD on January 16, 2020 3:31 PM        Visit Vitals  /62   Pulse 87   Ht 185.4 cm (73\")   Wt 93 kg (205 lb)   SpO2 99%   BMI 27.05 kg/m²       Body mass index is 27.05 kg/m².            This document has been electronically signed by Bridger Martínez MD on January 16, 2020 3:31 PM    "

## 2020-03-23 ENCOUNTER — TELEPHONE (OUTPATIENT)
Dept: FAMILY MEDICINE CLINIC | Facility: CLINIC | Age: 25
End: 2020-03-23

## 2020-03-23 RX ORDER — DIVALPROEX SODIUM 250 MG/1
250 TABLET, DELAYED RELEASE ORAL 3 TIMES DAILY
Qty: 270 TABLET | Refills: 0 | Status: SHIPPED | OUTPATIENT
Start: 2020-03-23 | End: 2020-03-30 | Stop reason: SDUPTHER

## 2020-03-30 ENCOUNTER — TELEMEDICINE (OUTPATIENT)
Dept: FAMILY MEDICINE CLINIC | Facility: CLINIC | Age: 25
End: 2020-03-30

## 2020-03-30 DIAGNOSIS — F90.0 ADHD, PREDOMINANTLY INATTENTIVE TYPE: Primary | ICD-10-CM

## 2020-03-30 PROCEDURE — 99213 OFFICE O/P EST LOW 20 MIN: CPT | Performed by: FAMILY MEDICINE

## 2020-03-30 RX ORDER — DIVALPROEX SODIUM 250 MG/1
250 TABLET, DELAYED RELEASE ORAL 3 TIMES DAILY
Qty: 270 TABLET | Refills: 3 | Status: SHIPPED | OUTPATIENT
Start: 2020-03-30 | End: 2020-04-28 | Stop reason: SDUPTHER

## 2020-03-30 RX ORDER — DEXTROAMPHETAMINE SACCHARATE, AMPHETAMINE ASPARTATE MONOHYDRATE, DEXTROAMPHETAMINE SULFATE AND AMPHETAMINE SULFATE 5; 5; 5; 5 MG/1; MG/1; MG/1; MG/1
20 CAPSULE, EXTENDED RELEASE ORAL EVERY MORNING
Qty: 30 CAPSULE | Refills: 0 | Status: SHIPPED | OUTPATIENT
Start: 2020-05-25 | End: 2020-06-24

## 2020-03-30 RX ORDER — DEXTROAMPHETAMINE SACCHARATE, AMPHETAMINE ASPARTATE MONOHYDRATE, DEXTROAMPHETAMINE SULFATE AND AMPHETAMINE SULFATE 5; 5; 5; 5 MG/1; MG/1; MG/1; MG/1
20 CAPSULE, EXTENDED RELEASE ORAL EVERY MORNING
Qty: 30 CAPSULE | Refills: 0 | Status: SHIPPED | OUTPATIENT
Start: 2020-04-27 | End: 2020-05-31 | Stop reason: SDUPTHER

## 2020-03-30 RX ORDER — DEXTROAMPHETAMINE SACCHARATE, AMPHETAMINE ASPARTATE MONOHYDRATE, DEXTROAMPHETAMINE SULFATE AND AMPHETAMINE SULFATE 5; 5; 5; 5 MG/1; MG/1; MG/1; MG/1
20 CAPSULE, EXTENDED RELEASE ORAL EVERY MORNING
Qty: 30 CAPSULE | Refills: 0 | Status: SHIPPED | OUTPATIENT
Start: 2020-03-30 | End: 2020-04-29

## 2020-03-30 RX ORDER — BUSPIRONE HYDROCHLORIDE 5 MG/1
5 TABLET ORAL 3 TIMES DAILY
Qty: 90 TABLET | Refills: 3 | Status: SHIPPED | OUTPATIENT
Start: 2020-03-30 | End: 2020-04-28 | Stop reason: SDUPTHER

## 2020-03-30 NOTE — PROGRESS NOTES
"Ricardo De Souza is a 24 y.o. male.   compliance with medication regimen  often fidgets with hands or feet or squirms in seat - no  - no, often runs about or climbs excessively in situations in which it is inappropriate or feel restless - No ,  , is often \"on the go\" or often acts as if \"driven by a motor\" - No  and often talks excessively - No loss of concentration -no    often blurts out answers before questions have been completed - yes, often has difficulty awaiting turn - No  and often interrupts or intrudes on others - No           Depression   Visit Type: follow-up  Patient is not experiencing: depressed mood, insomnia, irritability, nervousness/anxiety and palpitations.         The following portions of the patient's history were reviewed and updated as appropriate: allergies, past family history, past medical history, past social history, past surgical history and problem list.    Review of Systems   Constitutional: Negative for irritability and unexpected weight change.   Cardiovascular: Negative for palpitations.   Psychiatric/Behavioral: Negative for sleep disturbance. The patient is not nervous/anxious and does not have insomnia.        Objective   Physical Exam   Constitutional: He is oriented to person, place, and time. He appears well-developed and well-nourished. No distress.   HENT:   Head: Normocephalic and atraumatic.   Cardiovascular: Normal rate.   Neurological: He is alert and oriented to person, place, and time. No cranial nerve deficit (As best determined over video.  Was unable to do shoulder shrug or strength of head rotation.).   Reflex Scores:       Patellar reflexes are 2+ on the right side and 2+ on the left side.  Skin: Skin is warm and dry. He is not diaphoretic.   Psychiatric: He has a normal mood and affect. His speech is normal and behavior is normal. Judgment and thought content normal.   Nursing note and vitals reviewed.      Assessment/Plan   Problems Addressed " this Visit        Other    ADHD, predominantly inattentive type - Primary    Relevant Medications    busPIRone (BUSPAR) 5 MG tablet    amphetamine-dextroamphetamine XR (ADDERALL XR) 20 MG 24 hr capsule    amphetamine-dextroamphetamine XR (ADDERALL XR) 20 MG 24 hr capsule (Start on 4/27/2020)    amphetamine-dextroamphetamine XR (ADDERALL XR) 20 MG 24 hr capsule (Start on 5/25/2020)             depakote as mood stabilizer has helped        There were no vitals taken for this visit.    There is no height or weight on file to calculate BMI.            This document has been electronically signed by Bridger Martínez MD on March 30, 2020 14:18        There were no vitals taken for this visit.    There is no height or weight on file to calculate BMI.            This document has been electronically signed by Bridger Martínez MD on March 30, 2020 14:18      Review of Systems   Constitutional: Negative for irritability.   Cardiovascular: Negative for palpitations.   Psychiatric/Behavioral: Negative for sleep disturbance and depressed mood. The patient is not nervous/anxious and does not have insomnia.        Physical Exam   Constitutional: He is oriented to person, place, and time. He appears well-developed and well-nourished. No distress.   HENT:   Head: Normocephalic and atraumatic.   Cardiovascular: Normal rate.   Neurological: He is alert and oriented to person, place, and time. No cranial nerve deficit (As best determined over video.  Was unable to do shoulder shrug or strength of head rotation.).   Reflex Scores:       Patellar reflexes are 2+ on the right side and 2+ on the left side.  Skin: Skin is warm and dry. He is not diaphoretic.   Psychiatric: He has a normal mood and affect. His speech is normal and behavior is normal. Judgment and thought content normal.   Nursing note and vitals reviewed.

## 2020-04-28 RX ORDER — BUSPIRONE HYDROCHLORIDE 5 MG/1
5 TABLET ORAL 3 TIMES DAILY
Qty: 90 TABLET | Refills: 3 | Status: SHIPPED | OUTPATIENT
Start: 2020-04-28 | End: 2020-05-21 | Stop reason: SDUPTHER

## 2020-04-28 RX ORDER — DIVALPROEX SODIUM 250 MG/1
250 TABLET, DELAYED RELEASE ORAL 3 TIMES DAILY
Qty: 270 TABLET | Refills: 3 | Status: SHIPPED | OUTPATIENT
Start: 2020-04-28 | End: 2020-05-21 | Stop reason: SDUPTHER

## 2020-05-21 RX ORDER — DIVALPROEX SODIUM 250 MG/1
250 TABLET, DELAYED RELEASE ORAL 3 TIMES DAILY
Qty: 270 TABLET | Refills: 3 | Status: SHIPPED | OUTPATIENT
Start: 2020-05-21 | End: 2020-06-30 | Stop reason: SDUPTHER

## 2020-05-21 RX ORDER — BUSPIRONE HYDROCHLORIDE 5 MG/1
5 TABLET ORAL 3 TIMES DAILY
Qty: 90 TABLET | Refills: 3 | Status: SHIPPED | OUTPATIENT
Start: 2020-05-21 | End: 2020-06-30 | Stop reason: SDUPTHER

## 2020-05-21 NOTE — TELEPHONE ENCOUNTER
VERÓNICA, PATIENT'S WIFE, CALLED REQUESTING  A REFILL ON PATIENT'S   - busPIRone (BUSPAR) 5 MG tablet  - divalproex (DEPAKOTE) 250 MG  tablet    PHARMACY: [facility] - [address] - [phone]  - [phone]   [phone]    VERÓNICA'S CALLBACK NUMBER: [phone]

## 2020-05-31 DIAGNOSIS — F90.0 ADHD, PREDOMINANTLY INATTENTIVE TYPE: ICD-10-CM

## 2020-06-01 RX ORDER — DEXTROAMPHETAMINE SACCHARATE, AMPHETAMINE ASPARTATE MONOHYDRATE, DEXTROAMPHETAMINE SULFATE AND AMPHETAMINE SULFATE 5; 5; 5; 5 MG/1; MG/1; MG/1; MG/1
20 CAPSULE, EXTENDED RELEASE ORAL EVERY MORNING
Qty: 30 CAPSULE | Refills: 0 | Status: SHIPPED | OUTPATIENT
Start: 2020-06-01 | End: 2020-06-30

## 2020-06-01 NOTE — TELEPHONE ENCOUNTER
Dr. Martínez    Mr. Sean De Souza is requesting a refill on his Adderall XR #30     Last OV    Telemed 03/30/2020,   Pt Canceled Appt on 04/16/2020     Next Ilan OV    Visit date not found    Last Script Written  03/30/2020  #30 with No Refill      Last Jesu     01/16/2020    Please advise on refill    Thank you

## 2020-06-30 ENCOUNTER — TELEPHONE (OUTPATIENT)
Dept: FAMILY MEDICINE CLINIC | Facility: CLINIC | Age: 25
End: 2020-06-30

## 2020-06-30 ENCOUNTER — TELEMEDICINE (OUTPATIENT)
Dept: FAMILY MEDICINE CLINIC | Facility: CLINIC | Age: 25
End: 2020-06-30

## 2020-06-30 DIAGNOSIS — F41.1 GENERALIZED ANXIETY DISORDER: ICD-10-CM

## 2020-06-30 DIAGNOSIS — F90.0 ADHD, PREDOMINANTLY INATTENTIVE TYPE: Primary | ICD-10-CM

## 2020-06-30 PROCEDURE — 99213 OFFICE O/P EST LOW 20 MIN: CPT | Performed by: FAMILY MEDICINE

## 2020-06-30 RX ORDER — DEXTROAMPHETAMINE SACCHARATE, AMPHETAMINE ASPARTATE MONOHYDRATE, DEXTROAMPHETAMINE SULFATE AND AMPHETAMINE SULFATE 5; 5; 5; 5 MG/1; MG/1; MG/1; MG/1
20 CAPSULE, EXTENDED RELEASE ORAL EVERY MORNING
Qty: 30 CAPSULE | Refills: 0 | Status: SHIPPED | OUTPATIENT
Start: 2020-07-28 | End: 2020-08-27

## 2020-06-30 RX ORDER — DIVALPROEX SODIUM 250 MG/1
250 TABLET, DELAYED RELEASE ORAL 3 TIMES DAILY
Qty: 270 TABLET | Refills: 3 | Status: SHIPPED | OUTPATIENT
Start: 2020-06-30 | End: 2021-02-01 | Stop reason: SDUPTHER

## 2020-06-30 RX ORDER — DEXTROAMPHETAMINE SACCHARATE, AMPHETAMINE ASPARTATE MONOHYDRATE, DEXTROAMPHETAMINE SULFATE AND AMPHETAMINE SULFATE 5; 5; 5; 5 MG/1; MG/1; MG/1; MG/1
20 CAPSULE, EXTENDED RELEASE ORAL EVERY MORNING
Qty: 30 CAPSULE | Refills: 0 | Status: SHIPPED | OUTPATIENT
Start: 2020-06-30 | End: 2020-07-30

## 2020-06-30 RX ORDER — DEXTROAMPHETAMINE SACCHARATE, AMPHETAMINE ASPARTATE MONOHYDRATE, DEXTROAMPHETAMINE SULFATE AND AMPHETAMINE SULFATE 5; 5; 5; 5 MG/1; MG/1; MG/1; MG/1
20 CAPSULE, EXTENDED RELEASE ORAL EVERY MORNING
Qty: 30 CAPSULE | Refills: 0 | Status: SHIPPED | OUTPATIENT
Start: 2020-08-25 | End: 2020-09-04 | Stop reason: SDUPTHER

## 2020-06-30 RX ORDER — BUSPIRONE HYDROCHLORIDE 5 MG/1
5 TABLET ORAL 3 TIMES DAILY
Qty: 90 TABLET | Refills: 3 | Status: SHIPPED | OUTPATIENT
Start: 2020-06-30 | End: 2020-12-28

## 2020-06-30 NOTE — TELEPHONE ENCOUNTER
Dr. Martínez,    Mr. Sean De Souza is requesting a refill on his Adderall XR 20 mg #30    Last OV    03/30/2020, Telemed visit    Next Ilan OV    Visit date not found    Last Script Written  06/01/2020  #30 No Refill       Last Jesu     01/16/2020    Please advise on refill    Thank you

## 2020-06-30 NOTE — TELEPHONE ENCOUNTER
PTS WIFE CALLED IN FOR PT REFILL    PT CONTACT: [phone]    PT MEDS: amphetamine-dextroamphetamine XR (ADDERALL XR) 20 MG 24 hr capsule    PT PHARMACY: [facility] [address] - [phone] [facility] [phone]

## 2020-06-30 NOTE — PROGRESS NOTES
"Ricardo De Souza is a 25 y.o. male.   compliance with medication regimen  often fidgets with hands or feet or squirms in seat - no  - no, often runs about or climbs excessively in situations in which it is inappropriate or feel restless - No ,  , is often \"on the go\" or often acts as if \"driven by a motor\" - No  and often talks excessively - No loss of concentration - yes    often blurts out answers before questions have been completed - yes, often has difficulty awaiting turn - No  and often interrupts or intrudes on others - No           Depression   Visit Type: follow-up  Patient is not experiencing: depressed mood, insomnia, irritability, nervousness/anxiety and palpitations.         The following portions of the patient's history were reviewed and updated as appropriate: allergies, past family history, past medical history, past social history, past surgical history and problem list.    Review of Systems   Constitutional: Negative for irritability and unexpected weight change.   Cardiovascular: Negative for palpitations.   Psychiatric/Behavioral: Negative for sleep disturbance. The patient is not nervous/anxious and does not have insomnia.        Objective   Physical Exam   Constitutional: He is oriented to person, place, and time. He appears well-developed and well-nourished. No distress.   HENT:   Head: Normocephalic and atraumatic.   Neurological: He is alert and oriented to person, place, and time.   Skin: He is not diaphoretic.   Psychiatric: He has a normal mood and affect. His speech is normal and behavior is normal. Judgment and thought content normal.       Assessment/Plan   Problems Addressed this Visit        Other    ADHD, predominantly inattentive type - Primary    Relevant Medications    amphetamine-dextroamphetamine XR (ADDERALL XR) 20 MG 24 hr capsule    amphetamine-dextroamphetamine XR (ADDERALL XR) 20 MG 24 hr capsule (Start on 7/28/2020)    amphetamine-dextroamphetamine XR " (ADDERALL XR) 20 MG 24 hr capsule (Start on 8/25/2020)    Generalized anxiety disorder    Relevant Medications    amphetamine-dextroamphetamine XR (ADDERALL XR) 20 MG 24 hr capsule    amphetamine-dextroamphetamine XR (ADDERALL XR) 20 MG 24 hr capsule (Start on 7/28/2020)    amphetamine-dextroamphetamine XR (ADDERALL XR) 20 MG 24 hr capsule (Start on 8/25/2020)             depakote as mood stabilizer has helped        There were no vitals taken for this visit.    There is no height or weight on file to calculate BMI.            This document has been electronically signed by Bridger Martínez MD on June 30, 2020 15:19

## 2020-06-30 NOTE — PROGRESS NOTES
"Ricardo De Souza is a 25 y.o. male.   compliance with medication regimen  often fidgets with hands or feet or squirms in seat - no  - no, often runs about or climbs excessively in situations in which it is inappropriate or feel restless - No ,  , is often \"on the go\" or often acts as if \"driven by a motor\" - No  and often talks excessively - No loss of concentration -no    often blurts out answers before questions have been completed - yes, often has difficulty awaiting turn - No  and often interrupts or intrudes on others - No           Depression   Visit Type: follow-up  Patient is not experiencing: depressed mood, insomnia, irritability, nervousness/anxiety and palpitations.         The following portions of the patient's history were reviewed and updated as appropriate: allergies, past family history, past medical history, past social history, past surgical history and problem list.    Review of Systems   Constitutional: Negative for irritability and unexpected weight change.   Cardiovascular: Negative for palpitations.   Psychiatric/Behavioral: Negative for sleep disturbance. The patient is not nervous/anxious and does not have insomnia.        Objective   Physical Exam   Constitutional: He is oriented to person, place, and time. He appears well-developed and well-nourished. No distress.   HENT:   Head: Normocephalic and atraumatic.   Cardiovascular: Normal rate.   Neurological: He is alert and oriented to person, place, and time. No cranial nerve deficit (As best determined over video.  Was unable to do shoulder shrug or strength of head rotation.).   Reflex Scores:       Patellar reflexes are 2+ on the right side and 2+ on the left side.  Skin: Skin is warm and dry. He is not diaphoretic.   Psychiatric: He has a normal mood and affect. His speech is normal and behavior is normal. Judgment and thought content normal.   Nursing note and vitals reviewed.      Assessment/Plan   Problems Addressed " this Visit     None             depakote as mood stabilizer has helped        There were no vitals taken for this visit.    There is no height or weight on file to calculate BMI.            This document has been electronically signed by Bridger Martínez MD on June 30, 2020 15:05        There were no vitals taken for this visit.    There is no height or weight on file to calculate BMI.            This document has been electronically signed by Bridger Martínez MD on June 30, 2020 15:05      Review of Systems   Constitutional: Negative for irritability.   Cardiovascular: Negative for palpitations.   Psychiatric/Behavioral: Negative for sleep disturbance and depressed mood. The patient is not nervous/anxious and does not have insomnia.        Physical Exam   Constitutional: He is oriented to person, place, and time. He appears well-developed and well-nourished. No distress.   HENT:   Head: Normocephalic and atraumatic.   Cardiovascular: Normal rate.   Neurological: He is alert and oriented to person, place, and time. No cranial nerve deficit (As best determined over video.  Was unable to do shoulder shrug or strength of head rotation.).   Reflex Scores:       Patellar reflexes are 2+ on the right side and 2+ on the left side.  Skin: Skin is warm and dry. He is not diaphoretic.   Psychiatric: He has a normal mood and affect. His speech is normal and behavior is normal. Judgment and thought content normal.   Nursing note and vitals reviewed.

## 2020-06-30 NOTE — TELEPHONE ENCOUNTER
Medication is for ADHD.  It cannot be sent to [facility]. Per patient, it was sent to [facility] in [place].

## 2020-09-04 DIAGNOSIS — F90.0 ADHD, PREDOMINANTLY INATTENTIVE TYPE: ICD-10-CM

## 2020-09-04 NOTE — TELEPHONE ENCOUNTER
Dr. Martínez    Mr. Sean De Souza is requesting a refill on his Adderall XR 20 mg #30    Last OV    06/30/2020    Next Ilan OV    9/30/2020    Last Script Written  08/05/2020  #30, NR    Last Jesu     01/16/2020    Please advise on refill    Thank you

## 2020-09-04 NOTE — TELEPHONE ENCOUNTER
Caller: Melissa De Souza    Relationship: Emergency Contact    Medication needed:   Requested Prescriptions     Pending Prescriptions Disp Refills   • amphetamine-dextroamphetamine XR (ADDERALL XR) 20 MG 24 hr capsule 30 capsule 0     Sig: Take 1 capsule by mouth Every Morning for 30 days     Does the patient have less than a 3 day supply:  [x] Yes  [] No    What is the patient's preferred pharmacy: [address] [phone] [facility] [phone]

## 2020-09-08 RX ORDER — DEXTROAMPHETAMINE SACCHARATE, AMPHETAMINE ASPARTATE MONOHYDRATE, DEXTROAMPHETAMINE SULFATE AND AMPHETAMINE SULFATE 5; 5; 5; 5 MG/1; MG/1; MG/1; MG/1
20 CAPSULE, EXTENDED RELEASE ORAL EVERY MORNING
Qty: 30 CAPSULE | Refills: 0 | Status: SHIPPED | OUTPATIENT
Start: 2020-09-08 | End: 2020-10-08

## 2020-09-30 ENCOUNTER — TELEMEDICINE (OUTPATIENT)
Dept: FAMILY MEDICINE CLINIC | Facility: CLINIC | Age: 25
End: 2020-09-30

## 2020-09-30 DIAGNOSIS — F90.0 ADHD, PREDOMINANTLY INATTENTIVE TYPE: ICD-10-CM

## 2020-09-30 PROCEDURE — 99213 OFFICE O/P EST LOW 20 MIN: CPT | Performed by: FAMILY MEDICINE

## 2020-09-30 RX ORDER — DEXTROAMPHETAMINE SACCHARATE, AMPHETAMINE ASPARTATE, DEXTROAMPHETAMINE SULFATE AND AMPHETAMINE SULFATE 5; 5; 5; 5 MG/1; MG/1; MG/1; MG/1
20 TABLET ORAL DAILY
Qty: 30 TABLET | Refills: 0 | Status: SHIPPED | OUTPATIENT
Start: 2020-09-30 | End: 2020-10-12

## 2020-09-30 RX ORDER — DEXTROAMPHETAMINE SACCHARATE, AMPHETAMINE ASPARTATE, DEXTROAMPHETAMINE SULFATE AND AMPHETAMINE SULFATE 5; 5; 5; 5 MG/1; MG/1; MG/1; MG/1
20 TABLET ORAL DAILY
Qty: 30 TABLET | Refills: 0 | Status: SHIPPED | OUTPATIENT
Start: 2020-11-25 | End: 2020-10-12

## 2020-09-30 RX ORDER — DEXTROAMPHETAMINE SACCHARATE, AMPHETAMINE ASPARTATE, DEXTROAMPHETAMINE SULFATE AND AMPHETAMINE SULFATE 5; 5; 5; 5 MG/1; MG/1; MG/1; MG/1
20 TABLET ORAL DAILY
Qty: 30 TABLET | Refills: 0 | Status: SHIPPED | OUTPATIENT
Start: 2020-10-28 | End: 2020-10-12

## 2020-09-30 NOTE — PROGRESS NOTES
"Ricardo De Souza is a 25 y.o. male.   compliance with medication regimen  often fidgets with hands or feet or squirms in seat - no  - no, often runs about or climbs excessively in situations in which it is inappropriate or feel restless - No ,  , is often \"on the go\" or often acts as if \"driven by a motor\" - No  and often talks excessively - No loss of concentration - yes    often blurts out answers before questions have been completed - yes, often has difficulty awaiting turn - No  and often interrupts or intrudes on others - No           Depression  Visit Type: follow-up  Patient is not experiencing: depressed mood, insomnia, irritability, nervousness/anxiety and palpitations.         The following portions of the patient's history were reviewed and updated as appropriate: allergies, past family history, past medical history, past social history, past surgical history and problem list.    Review of Systems   Constitutional: Negative for irritability and unexpected weight change.   Cardiovascular: Negative for palpitations.   Psychiatric/Behavioral: Negative for sleep disturbance. The patient is not nervous/anxious and does not have insomnia.        Objective   Physical Exam   Constitutional: He is oriented to person, place, and time. He appears well-developed. No distress.   HENT:   Head: Normocephalic and atraumatic.   Neurological: He is alert and oriented to person, place, and time.   Skin: He is not diaphoretic.   Psychiatric: His speech is normal and behavior is normal. Judgment and thought content normal.       Assessment/Plan   Problems Addressed this Visit        Other    ADHD, predominantly inattentive type    Relevant Medications    amphetamine-dextroamphetamine (Adderall) 20 MG tablet    amphetamine-dextroamphetamine (Adderall) 20 MG tablet (Start on 10/28/2020)    amphetamine-dextroamphetamine (Adderall) 20 MG tablet (Start on 11/25/2020)      Diagnoses       Codes Comments    ADHD, " predominantly inattentive type     ICD-10-CM: F90.0  ICD-9-CM: 314.00         You have chosen to receive care through a telehealth visit.  Do you consent to use a video/audio connection for your medical care today? Yes  11 minutes spent with video visit       depakote as mood stabilizer has helped        There were no vitals taken for this visit.    There is no height or weight on file to calculate BMI.            This document has been electronically signed by Bridger Martínez MD on September 30, 2020 14:25 CDT

## 2020-10-12 ENCOUNTER — TELEPHONE (OUTPATIENT)
Dept: FAMILY MEDICINE CLINIC | Facility: CLINIC | Age: 25
End: 2020-10-12

## 2020-10-12 DIAGNOSIS — F90.0 ADHD, PREDOMINANTLY INATTENTIVE TYPE: ICD-10-CM

## 2020-10-12 RX ORDER — DEXTROAMPHETAMINE SACCHARATE, AMPHETAMINE ASPARTATE MONOHYDRATE, DEXTROAMPHETAMINE SULFATE AND AMPHETAMINE SULFATE 5; 5; 5; 5 MG/1; MG/1; MG/1; MG/1
20 CAPSULE, EXTENDED RELEASE ORAL EVERY MORNING
Qty: 30 CAPSULE | Refills: 0 | Status: SHIPPED | OUTPATIENT
Start: 2020-12-07 | End: 2020-12-18 | Stop reason: SDUPTHER

## 2020-10-12 RX ORDER — DEXTROAMPHETAMINE SACCHARATE, AMPHETAMINE ASPARTATE MONOHYDRATE, DEXTROAMPHETAMINE SULFATE AND AMPHETAMINE SULFATE 5; 5; 5; 5 MG/1; MG/1; MG/1; MG/1
20 CAPSULE, EXTENDED RELEASE ORAL EVERY MORNING
Qty: 30 CAPSULE | Refills: 0 | Status: SHIPPED | OUTPATIENT
Start: 2020-11-09 | End: 2020-12-09

## 2020-10-12 RX ORDER — DEXTROAMPHETAMINE SACCHARATE, AMPHETAMINE ASPARTATE MONOHYDRATE, DEXTROAMPHETAMINE SULFATE AND AMPHETAMINE SULFATE 5; 5; 5; 5 MG/1; MG/1; MG/1; MG/1
20 CAPSULE, EXTENDED RELEASE ORAL EVERY MORNING
Qty: 30 CAPSULE | Refills: 0 | Status: SHIPPED | OUTPATIENT
Start: 2020-10-12 | End: 2020-11-11

## 2020-10-12 NOTE — TELEPHONE ENCOUNTER
Patient called and advised that Rx was sent to pharmacy for amphetamine-dextroamphetamine (Adderall) 20 MG tablet and Rx was supposed to be sent to pharmacy for amphetamine-dextroamphetamine XR (ADDERALL XR) 20 MG 24 hr capsule.    Patient wanted to know if this was changed by PCP or sent in incorrectly.     Please contact patient and advise @ [phone].

## 2020-12-16 ENCOUNTER — TELEPHONE (OUTPATIENT)
Dept: FAMILY MEDICINE CLINIC | Facility: CLINIC | Age: 25
End: 2020-12-16

## 2020-12-16 DIAGNOSIS — F90.0 ADHD, PREDOMINANTLY INATTENTIVE TYPE: ICD-10-CM

## 2020-12-16 NOTE — TELEPHONE ENCOUNTER
PATIENTS MOTHER HAS CALLED, WANTING TO REQUEST A REFILL FOR PATIENTS amphetamine-dextroamphetamine XR (ADDERALL XR) 20 MG 24 hr capsule      CALL BACK [phone]      (PATIENT TOOK HIS LAST PILL TODAY)    [facility] - [place], [place] - [address] - [phone]  - [phone]   [phone]

## 2020-12-17 RX ORDER — DEXTROAMPHETAMINE SACCHARATE, AMPHETAMINE ASPARTATE MONOHYDRATE, DEXTROAMPHETAMINE SULFATE AND AMPHETAMINE SULFATE 5; 5; 5; 5 MG/1; MG/1; MG/1; MG/1
20 CAPSULE, EXTENDED RELEASE ORAL EVERY MORNING
Qty: 30 CAPSULE | Refills: 0 | Status: CANCELLED | OUTPATIENT
Start: 2020-12-17 | End: 2021-01-16

## 2020-12-17 NOTE — TELEPHONE ENCOUNTER
Caller: Sean De Souza    Relationship: Self    Best call back number: [phone]     Medication needed:   Requested Prescriptions     Pending Prescriptions Disp Refills   • amphetamine-dextroamphetamine XR (ADDERALL XR) 20 MG 24 hr capsule 30 capsule 0     Sig: Take 1 capsule by mouth Every Morning for 30 days       When do you need the refill by: ASAP     What details did the patient provide when requesting the medication: PATIENT IS OUT OF MEDICATION    Does the patient have less than a 3 day supply:  [x] Yes  [] No    What is the patient's preferred pharmacy: [address] [phone] [facility] [phone]

## 2020-12-18 DIAGNOSIS — F90.0 ADHD, PREDOMINANTLY INATTENTIVE TYPE: ICD-10-CM

## 2020-12-18 RX ORDER — DEXTROAMPHETAMINE SACCHARATE, AMPHETAMINE ASPARTATE MONOHYDRATE, DEXTROAMPHETAMINE SULFATE AND AMPHETAMINE SULFATE 5; 5; 5; 5 MG/1; MG/1; MG/1; MG/1
20 CAPSULE, EXTENDED RELEASE ORAL EVERY MORNING
Qty: 30 CAPSULE | Refills: 0 | Status: SHIPPED | OUTPATIENT
Start: 2020-12-18 | End: 2020-12-28 | Stop reason: SDUPTHER

## 2020-12-28 DIAGNOSIS — F90.0 ADHD, PREDOMINANTLY INATTENTIVE TYPE: ICD-10-CM

## 2020-12-28 RX ORDER — DEXTROAMPHETAMINE SACCHARATE, AMPHETAMINE ASPARTATE MONOHYDRATE, DEXTROAMPHETAMINE SULFATE AND AMPHETAMINE SULFATE 5; 5; 5; 5 MG/1; MG/1; MG/1; MG/1
20 CAPSULE, EXTENDED RELEASE ORAL EVERY MORNING
Qty: 30 CAPSULE | Refills: 0 | Status: SHIPPED | OUTPATIENT
Start: 2020-12-28 | End: 2021-01-27

## 2020-12-28 RX ORDER — BUSPIRONE HYDROCHLORIDE 5 MG/1
TABLET ORAL
Qty: 90 TABLET | Refills: 0 | Status: SHIPPED | OUTPATIENT
Start: 2020-12-28 | End: 2021-02-01 | Stop reason: SDUPTHER

## 2021-02-01 ENCOUNTER — OFFICE VISIT (OUTPATIENT)
Dept: FAMILY MEDICINE CLINIC | Facility: CLINIC | Age: 26
End: 2021-02-01

## 2021-02-01 VITALS
HEART RATE: 85 BPM | WEIGHT: 218 LBS | BODY MASS INDEX: 28.89 KG/M2 | OXYGEN SATURATION: 99 % | DIASTOLIC BLOOD PRESSURE: 80 MMHG | HEIGHT: 73 IN | SYSTOLIC BLOOD PRESSURE: 128 MMHG

## 2021-02-01 DIAGNOSIS — F90.0 ADHD, PREDOMINANTLY INATTENTIVE TYPE: Primary | ICD-10-CM

## 2021-02-01 DIAGNOSIS — F41.9 ANXIETY: ICD-10-CM

## 2021-02-01 DIAGNOSIS — F39 MOOD DISORDER (HCC): ICD-10-CM

## 2021-02-01 PROCEDURE — 99214 OFFICE O/P EST MOD 30 MIN: CPT | Performed by: FAMILY MEDICINE

## 2021-02-01 RX ORDER — BUSPIRONE HYDROCHLORIDE 5 MG/1
5 TABLET ORAL 3 TIMES DAILY
Qty: 90 TABLET | Refills: 5 | Status: SHIPPED | OUTPATIENT
Start: 2021-02-01 | End: 2021-08-24 | Stop reason: SDUPTHER

## 2021-02-01 RX ORDER — DEXTROAMPHETAMINE SACCHARATE, AMPHETAMINE ASPARTATE, DEXTROAMPHETAMINE SULFATE AND AMPHETAMINE SULFATE 5; 5; 5; 5 MG/1; MG/1; MG/1; MG/1
20 TABLET ORAL DAILY
Qty: 30 TABLET | Refills: 0 | Status: SHIPPED | OUTPATIENT
Start: 2021-02-01 | End: 2021-02-02

## 2021-02-01 RX ORDER — DEXTROAMPHETAMINE SACCHARATE, AMPHETAMINE ASPARTATE, DEXTROAMPHETAMINE SULFATE AND AMPHETAMINE SULFATE 5; 5; 5; 5 MG/1; MG/1; MG/1; MG/1
20 TABLET ORAL DAILY
COMMUNITY
End: 2021-02-01 | Stop reason: SDUPTHER

## 2021-02-01 RX ORDER — DIVALPROEX SODIUM 250 MG/1
250 TABLET, DELAYED RELEASE ORAL 3 TIMES DAILY
Qty: 90 TABLET | Refills: 5 | Status: SHIPPED | OUTPATIENT
Start: 2021-02-01 | End: 2021-08-24 | Stop reason: SDUPTHER

## 2021-02-01 NOTE — PROGRESS NOTES
Ricardo De Souza is a 25 y.o. male.   Cc: follow up of chronic medical issues    Anxiety  Presents for follow-up visit. Symptoms include nervous/anxious behavior. Patient reports no chest pain, compulsions, confusion, decreased concentration, excessive worry, feeling of choking, hyperventilation, insomnia, irritability, malaise, muscle tension, obsessions, palpitations, panic, restlessness, shortness of breath or suicidal ideas.       ADHD - He is able to concentrate ,stay on task and complete tasks.  Mood Disorder- His symptoms are stable. He doesn't get irritable      The following portions of the patient's history were reviewed and updated as appropriate: allergies, current medications, past family history, past medical history, past social history, past surgical history and problem list.    Review of Systems   Constitutional: Negative for irritability.   Respiratory: Negative for shortness of breath.    Cardiovascular: Negative for chest pain and palpitations.   Psychiatric/Behavioral: Negative for confusion, decreased concentration and suicidal ideas. The patient is nervous/anxious. The patient does not have insomnia.        Objective   Physical Exam  Vitals signs reviewed.   Constitutional:       Appearance: Normal appearance.   HENT:      Head: Normocephalic and atraumatic.      Right Ear: Tympanic membrane, ear canal and external ear normal.      Left Ear: Tympanic membrane, ear canal and external ear normal.      Nose: Nose normal.      Mouth/Throat:      Mouth: Mucous membranes are moist.      Pharynx: Oropharynx is clear.   Eyes:      Extraocular Movements: Extraocular movements intact.      Pupils: Pupils are equal, round, and reactive to light.   Neck:      Musculoskeletal: Normal range of motion and neck supple.   Cardiovascular:      Rate and Rhythm: Normal rate and regular rhythm.      Heart sounds: Normal heart sounds. No murmur. No friction rub. No gallop.    Pulmonary:       "Effort: Pulmonary effort is normal. No respiratory distress.      Breath sounds: No stridor. No wheezing, rhonchi or rales.   Chest:      Chest wall: No tenderness.   Abdominal:      General: Abdomen is flat. There is no distension.      Palpations: Abdomen is soft.      Tenderness: There is no abdominal tenderness.   Neurological:      Mental Status: He is alert.           Visit Vitals  /80   Pulse 85   Ht 185.4 cm (73\")   Wt 98.9 kg (218 lb)   SpO2 99%   BMI 28.76 kg/m²     Body mass index is 28.76 kg/m².      Assessment/Plan   Diagnoses and all orders for this visit:    1. ADHD, predominantly inattentive type (Primary)  -     Discontinue: amphetamine-dextroamphetamine (ADDERALL) 20 MG tablet; Take 1 tablet by mouth Daily.  Dispense: 30 tablet; Refill: 0  -     amphetamine-dextroamphetamine XR (ADDERALL XR) 20 MG 24 hr capsule; Take 1 capsule by mouth Every Morning  Dispense: 30 capsule; Refill: 0    2. Anxiety    3. Mood disorder (CMS/HCC)    Other orders  -     busPIRone (BUSPAR) 5 MG tablet; Take 1 tablet by mouth 3 (Three) Times a Day.  Dispense: 90 tablet; Refill: 5  -     divalproex (DEPAKOTE) 250 MG DR tablet; Take 1 tablet by mouth 3 (Three) Times a Day.  Dispense: 90 tablet; Refill: 5    Return to the clinic in 3 month/s.  Will contact with results as needed.    "

## 2021-02-02 ENCOUNTER — TELEPHONE (OUTPATIENT)
Dept: FAMILY MEDICINE CLINIC | Facility: CLINIC | Age: 26
End: 2021-02-02

## 2021-02-02 RX ORDER — DEXTROAMPHETAMINE SACCHARATE, AMPHETAMINE ASPARTATE MONOHYDRATE, DEXTROAMPHETAMINE SULFATE AND AMPHETAMINE SULFATE 5; 5; 5; 5 MG/1; MG/1; MG/1; MG/1
20 CAPSULE, EXTENDED RELEASE ORAL EVERY MORNING
Qty: 30 CAPSULE | Refills: 0 | Status: SHIPPED | OUTPATIENT
Start: 2021-02-02 | End: 2021-03-05 | Stop reason: SDUPTHER

## 2021-02-02 NOTE — TELEPHONE ENCOUNTER
Needs clarification on his adderall. Says a different release was called in this time and the patient said it is not right.

## 2021-03-05 ENCOUNTER — TELEPHONE (OUTPATIENT)
Dept: FAMILY MEDICINE CLINIC | Facility: CLINIC | Age: 26
End: 2021-03-05

## 2021-03-05 DIAGNOSIS — F90.0 ADHD, PREDOMINANTLY INATTENTIVE TYPE: ICD-10-CM

## 2021-03-05 RX ORDER — DEXTROAMPHETAMINE SACCHARATE, AMPHETAMINE ASPARTATE MONOHYDRATE, DEXTROAMPHETAMINE SULFATE AND AMPHETAMINE SULFATE 5; 5; 5; 5 MG/1; MG/1; MG/1; MG/1
20 CAPSULE, EXTENDED RELEASE ORAL EVERY MORNING
Qty: 30 CAPSULE | Refills: 0 | Status: SHIPPED | OUTPATIENT
Start: 2021-03-05 | End: 2021-03-09 | Stop reason: SDUPTHER

## 2021-03-08 ENCOUNTER — TELEPHONE (OUTPATIENT)
Dept: FAMILY MEDICINE CLINIC | Facility: CLINIC | Age: 26
End: 2021-03-08

## 2021-03-09 DIAGNOSIS — F90.0 ADHD, PREDOMINANTLY INATTENTIVE TYPE: ICD-10-CM

## 2021-03-09 RX ORDER — DEXTROAMPHETAMINE SACCHARATE, AMPHETAMINE ASPARTATE MONOHYDRATE, DEXTROAMPHETAMINE SULFATE AND AMPHETAMINE SULFATE 5; 5; 5; 5 MG/1; MG/1; MG/1; MG/1
20 CAPSULE, EXTENDED RELEASE ORAL EVERY MORNING
Qty: 30 CAPSULE | Refills: 0 | Status: SHIPPED | OUTPATIENT
Start: 2021-03-09 | End: 2021-04-12 | Stop reason: SDUPTHER

## 2021-04-12 DIAGNOSIS — F90.0 ADHD, PREDOMINANTLY INATTENTIVE TYPE: ICD-10-CM

## 2021-04-12 RX ORDER — DEXTROAMPHETAMINE SACCHARATE, AMPHETAMINE ASPARTATE MONOHYDRATE, DEXTROAMPHETAMINE SULFATE AND AMPHETAMINE SULFATE 5; 5; 5; 5 MG/1; MG/1; MG/1; MG/1
20 CAPSULE, EXTENDED RELEASE ORAL EVERY MORNING
Qty: 30 CAPSULE | Refills: 0 | Status: SHIPPED | OUTPATIENT
Start: 2021-04-12 | End: 2021-05-13 | Stop reason: SDUPTHER

## 2021-04-12 NOTE — TELEPHONE ENCOUNTER
Needing a refill on his Adderall called to [facility]. Pt is out of medication.

## 2021-05-13 DIAGNOSIS — F90.0 ADHD, PREDOMINANTLY INATTENTIVE TYPE: ICD-10-CM

## 2021-05-13 RX ORDER — DEXTROAMPHETAMINE SACCHARATE, AMPHETAMINE ASPARTATE MONOHYDRATE, DEXTROAMPHETAMINE SULFATE AND AMPHETAMINE SULFATE 5; 5; 5; 5 MG/1; MG/1; MG/1; MG/1
20 CAPSULE, EXTENDED RELEASE ORAL EVERY MORNING
Qty: 30 CAPSULE | Refills: 0 | Status: SHIPPED | OUTPATIENT
Start: 2021-05-13 | End: 2021-05-28 | Stop reason: SDUPTHER

## 2021-05-28 ENCOUNTER — OFFICE VISIT (OUTPATIENT)
Dept: FAMILY MEDICINE CLINIC | Facility: CLINIC | Age: 26
End: 2021-05-28

## 2021-05-28 VITALS
HEIGHT: 73 IN | BODY MASS INDEX: 28.24 KG/M2 | WEIGHT: 213.1 LBS | HEART RATE: 75 BPM | OXYGEN SATURATION: 96 % | SYSTOLIC BLOOD PRESSURE: 112 MMHG | DIASTOLIC BLOOD PRESSURE: 82 MMHG

## 2021-05-28 DIAGNOSIS — R41.840 CONCENTRATION DEFICIT: ICD-10-CM

## 2021-05-28 DIAGNOSIS — F90.0 ADHD, PREDOMINANTLY INATTENTIVE TYPE: Primary | ICD-10-CM

## 2021-05-28 PROCEDURE — 99214 OFFICE O/P EST MOD 30 MIN: CPT | Performed by: FAMILY MEDICINE

## 2021-05-28 RX ORDER — DEXTROAMPHETAMINE SACCHARATE, AMPHETAMINE ASPARTATE MONOHYDRATE, DEXTROAMPHETAMINE SULFATE AND AMPHETAMINE SULFATE 5; 5; 5; 5 MG/1; MG/1; MG/1; MG/1
20 CAPSULE, EXTENDED RELEASE ORAL EVERY MORNING
Qty: 30 CAPSULE | Refills: 0 | Status: SHIPPED | OUTPATIENT
Start: 2021-05-28 | End: 2021-07-20 | Stop reason: SDUPTHER

## 2021-05-28 NOTE — PROGRESS NOTES
"Ricardo De Souza is a 26 y.o. male.   Cc: follow up of chronic medical issues    HPI  ADHD -The patient is able to concentrate,stay on task and complete projects.He feels that the medication helps.  The following portions of the patient's history were reviewed and updated as appropriate: allergies, current medications, past family history, past medical history, past social history, past surgical history and problem list.    Review of Systems   Constitutional: Negative for fatigue.   Respiratory: Negative for cough and shortness of breath.    Psychiatric/Behavioral: Negative for agitation, confusion and decreased concentration.       Objective   Physical Exam  Vitals reviewed.   Constitutional:       Appearance: Normal appearance.   HENT:      Head: Normocephalic and atraumatic.      Right Ear: Tympanic membrane, ear canal and external ear normal.      Left Ear: Tympanic membrane, ear canal and external ear normal.      Nose: Nose normal.      Mouth/Throat:      Mouth: Mucous membranes are moist.      Pharynx: Oropharynx is clear.   Cardiovascular:      Rate and Rhythm: Normal rate.      Heart sounds: Normal heart sounds. No murmur heard.   No friction rub. No gallop.    Pulmonary:      Effort: Pulmonary effort is normal. No respiratory distress.      Breath sounds: Normal breath sounds. No stridor. No wheezing, rhonchi or rales.   Chest:      Chest wall: No tenderness.   Abdominal:      General: Abdomen is flat. Bowel sounds are normal. There is no distension.      Palpations: Abdomen is soft. There is no mass.      Tenderness: There is no abdominal tenderness.      Hernia: No hernia is present.   Musculoskeletal:      Cervical back: Normal range of motion.   Skin:     General: Skin is warm and dry.   Neurological:      Mental Status: He is alert.           Visit Vitals  /82   Pulse 75   Ht 185.4 cm (73\")   Wt 96.7 kg (213 lb 1.6 oz)   SpO2 96%   BMI 28.12 kg/m²     Body mass index is 28.12 " kg/m².      Assessment/Plan   Diagnoses and all orders for this visit:    1. ADHD, predominantly inattentive type (Primary)  -     amphetamine-dextroamphetamine XR (ADDERALL XR) 20 MG 24 hr capsule; Take 1 capsule by mouth Every Morning  Dispense: 30 capsule; Refill: 0    2. Concentration deficit    ANDREA is appropriate.  Return to the clinic in 3 month/s.  Will contact with results as needed.

## 2021-07-20 DIAGNOSIS — F90.0 ADHD, PREDOMINANTLY INATTENTIVE TYPE: ICD-10-CM

## 2021-07-21 RX ORDER — DEXTROAMPHETAMINE SACCHARATE, AMPHETAMINE ASPARTATE MONOHYDRATE, DEXTROAMPHETAMINE SULFATE AND AMPHETAMINE SULFATE 5; 5; 5; 5 MG/1; MG/1; MG/1; MG/1
20 CAPSULE, EXTENDED RELEASE ORAL EVERY MORNING
Qty: 30 CAPSULE | Refills: 0 | Status: SHIPPED | OUTPATIENT
Start: 2021-07-21 | End: 2021-08-24 | Stop reason: SDUPTHER

## 2021-08-24 DIAGNOSIS — F90.0 ADHD, PREDOMINANTLY INATTENTIVE TYPE: ICD-10-CM

## 2021-08-24 RX ORDER — DIVALPROEX SODIUM 250 MG/1
250 TABLET, DELAYED RELEASE ORAL 3 TIMES DAILY
Qty: 90 TABLET | Refills: 0 | Status: SHIPPED | OUTPATIENT
Start: 2021-08-24 | End: 2022-02-02

## 2021-08-24 RX ORDER — DEXTROAMPHETAMINE SACCHARATE, AMPHETAMINE ASPARTATE MONOHYDRATE, DEXTROAMPHETAMINE SULFATE AND AMPHETAMINE SULFATE 5; 5; 5; 5 MG/1; MG/1; MG/1; MG/1
20 CAPSULE, EXTENDED RELEASE ORAL EVERY MORNING
Qty: 30 CAPSULE | Refills: 0 | Status: SHIPPED | OUTPATIENT
Start: 2021-08-24 | End: 2021-09-17 | Stop reason: SDUPTHER

## 2021-08-24 RX ORDER — BUSPIRONE HYDROCHLORIDE 5 MG/1
5 TABLET ORAL 3 TIMES DAILY
Qty: 90 TABLET | Refills: 0 | Status: SHIPPED | OUTPATIENT
Start: 2021-08-24 | End: 2021-11-09 | Stop reason: SDUPTHER

## 2021-08-24 NOTE — TELEPHONE ENCOUNTER
Pt wife is calling requesting a refill on his Adderall 20mg, Buspar 5mg, and his Depakota 250mg sent to [facility] in [facility].    Thanks

## 2021-09-17 ENCOUNTER — TELEMEDICINE (OUTPATIENT)
Dept: FAMILY MEDICINE CLINIC | Facility: CLINIC | Age: 26
End: 2021-09-17

## 2021-09-17 VITALS — RESPIRATION RATE: 20 BRPM | HEART RATE: 60 BPM

## 2021-09-17 DIAGNOSIS — R41.840 CONCENTRATION DEFICIT: ICD-10-CM

## 2021-09-17 DIAGNOSIS — F90.0 ADHD, PREDOMINANTLY INATTENTIVE TYPE: Primary | ICD-10-CM

## 2021-09-17 PROCEDURE — 99214 OFFICE O/P EST MOD 30 MIN: CPT | Performed by: FAMILY MEDICINE

## 2021-09-17 RX ORDER — DEXTROAMPHETAMINE SACCHARATE, AMPHETAMINE ASPARTATE MONOHYDRATE, DEXTROAMPHETAMINE SULFATE AND AMPHETAMINE SULFATE 5; 5; 5; 5 MG/1; MG/1; MG/1; MG/1
20 CAPSULE, EXTENDED RELEASE ORAL EVERY MORNING
Qty: 30 CAPSULE | Refills: 0 | Status: SHIPPED | OUTPATIENT
Start: 2021-09-17 | End: 2021-10-25 | Stop reason: SDUPTHER

## 2021-09-17 NOTE — PROGRESS NOTES
You have chosen to receive care through a telehealth visit.  Do you consent to use a video/audio connection for your medical care today? Yes      Ricarod De Souza is a 26 y.o. male.     HPI  As long as he takes his medication, he can stay on task,concentrate better and complete tasks, If he doesn't take the medication, he gets foggy and distracted and it effects his work performance.  The following portions of the patient's history were reviewed and updated as appropriate: allergies, current medications, past family history, past medical history, past social history, past surgical history and problem list.    Review of Systems   Constitutional: Negative for activity change and appetite change.   HENT: Negative for congestion.    Respiratory: Negative for cough.    Cardiovascular: Negative for chest pain and leg swelling.   Psychiatric/Behavioral: Negative for agitation, behavioral problems, confusion and decreased concentration.       Objective   Physical Exam  Vitals reviewed.   Constitutional:       Appearance: Normal appearance.   HENT:      Head: Normocephalic and atraumatic.      Left Ear: External ear normal.      Nose: Nose normal.      Mouth/Throat:      Mouth: Mucous membranes are moist.      Pharynx: Oropharynx is clear.   Eyes:      Extraocular Movements: Extraocular movements intact.      Pupils: Pupils are equal, round, and reactive to light.   Cardiovascular:      Rate and Rhythm: Regular rhythm.   Pulmonary:      Effort: Pulmonary effort is normal. No respiratory distress.   Abdominal:      Tenderness: There is no abdominal tenderness.   Musculoskeletal:      Cervical back: Normal range of motion and neck supple.   Skin:     General: Skin is warm and dry.   Neurological:      Mental Status: He is alert.           Visit Vitals  Pulse 60   Resp 20     There is no height or weight on file to calculate BMI.      Assessment/Plan     Diagnoses and all orders for this visit:    1. ADHD,  predominantly inattentive type (Primary)  -     amphetamine-dextroamphetamine XR (ADDERALL XR) 20 MG 24 hr capsule; Take 1 capsule by mouth Every Morning  Dispense: 30 capsule; Refill: 0    2. Concentration deficit    I reviewed his Adderall. He has an appropriate ANDREA.  Return to the clinic in 1 month/s.  Will contact with results as needed.  This was an audio-video enabled telemedicine encounter-time spent was 10 minutes.

## 2021-10-25 DIAGNOSIS — F90.0 ADHD, PREDOMINANTLY INATTENTIVE TYPE: ICD-10-CM

## 2021-10-25 RX ORDER — DEXTROAMPHETAMINE SACCHARATE, AMPHETAMINE ASPARTATE MONOHYDRATE, DEXTROAMPHETAMINE SULFATE AND AMPHETAMINE SULFATE 5; 5; 5; 5 MG/1; MG/1; MG/1; MG/1
20 CAPSULE, EXTENDED RELEASE ORAL EVERY MORNING
Qty: 30 CAPSULE | Refills: 0 | Status: SHIPPED | OUTPATIENT
Start: 2021-10-25 | End: 2021-11-29 | Stop reason: SDUPTHER

## 2021-11-09 RX ORDER — BUSPIRONE HYDROCHLORIDE 5 MG/1
5 TABLET ORAL 3 TIMES DAILY
Qty: 90 TABLET | Refills: 0 | Status: SHIPPED | OUTPATIENT
Start: 2021-11-09 | End: 2022-01-31

## 2021-11-09 NOTE — TELEPHONE ENCOUNTER
Incoming Refill Request      Medication requested (name and dose): [facility]    [facility]    Additional details provided by patient: PT HAS BEEN OUT OF THIS MEDICATION FOR OVER 1WK    Best call back number: [phone]    Does the patient have less than a 3 day supply:  [x] Yes  [] No    Melissa Serrano Rep  11/09/21, 16:24 CST

## 2021-11-29 DIAGNOSIS — F90.0 ADHD, PREDOMINANTLY INATTENTIVE TYPE: ICD-10-CM

## 2021-11-29 NOTE — TELEPHONE ENCOUNTER
Incoming Refill Request      Medication requested (name and dose): [facility]    Pharmacy where request should be sent: [facility]    Additional details provided by patient: PT HAS BEEN OUT OF MEDICATION SINCE 11-27-21    Best call back number: [phone]    Does the patient have less than a 3 day supply:  [x] Yes  [] No    Melissa Serrano Rep  11/29/21, 16:40 CST

## 2021-11-30 RX ORDER — DEXTROAMPHETAMINE SACCHARATE, AMPHETAMINE ASPARTATE MONOHYDRATE, DEXTROAMPHETAMINE SULFATE AND AMPHETAMINE SULFATE 5; 5; 5; 5 MG/1; MG/1; MG/1; MG/1
20 CAPSULE, EXTENDED RELEASE ORAL EVERY MORNING
Qty: 30 CAPSULE | Refills: 0 | Status: SHIPPED | OUTPATIENT
Start: 2021-11-30 | End: 2021-12-17 | Stop reason: SDUPTHER

## 2021-12-17 ENCOUNTER — TELEMEDICINE (OUTPATIENT)
Dept: FAMILY MEDICINE CLINIC | Facility: CLINIC | Age: 26
End: 2021-12-17

## 2021-12-17 VITALS — HEART RATE: 72 BPM | RESPIRATION RATE: 16 BRPM

## 2021-12-17 DIAGNOSIS — F90.0 ADHD, PREDOMINANTLY INATTENTIVE TYPE: Primary | ICD-10-CM

## 2021-12-17 DIAGNOSIS — R41.840 CONCENTRATION DEFICIT: ICD-10-CM

## 2021-12-17 PROCEDURE — 99214 OFFICE O/P EST MOD 30 MIN: CPT | Performed by: FAMILY MEDICINE

## 2021-12-17 RX ORDER — DEXTROAMPHETAMINE SACCHARATE, AMPHETAMINE ASPARTATE MONOHYDRATE, DEXTROAMPHETAMINE SULFATE AND AMPHETAMINE SULFATE 5; 5; 5; 5 MG/1; MG/1; MG/1; MG/1
20 CAPSULE, EXTENDED RELEASE ORAL EVERY MORNING
Qty: 30 CAPSULE | Refills: 0 | Status: SHIPPED | OUTPATIENT
Start: 2021-12-17 | End: 2022-01-31

## 2021-12-17 NOTE — PROGRESS NOTES
You have chosen to receive care through a telehealth visit.  Do you consent to use a video/audio connection for your medical care today? Yes    Ricardo De Souza is a 26 y.o. male.   Cc: follow up of chronic medical issues    HPI  ADHD- As long as he stays on the medication, he can concentrate better,Stay on task and complete tasks.  He feels that things are going well.\    The following portions of the patient's history were reviewed and updated as appropriate: allergies, current medications, past family history, past medical history, past social history, past surgical history and problem list.    Review of Systems   Constitutional: Negative for fatigue and fever.   Respiratory: Negative for cough.    Cardiovascular: Negative for leg swelling.   Psychiatric/Behavioral: Negative for agitation, behavioral problems, confusion and decreased concentration.       Objective   Physical Exam  Vitals reviewed.   Constitutional:       Appearance: Normal appearance.   HENT:      Head: Normocephalic and atraumatic.      Right Ear: External ear normal.      Left Ear: External ear normal.      Nose: Nose normal.      Mouth/Throat:      Mouth: Mucous membranes are moist.      Pharynx: Oropharynx is clear.   Eyes:      Extraocular Movements: Extraocular movements intact.      Pupils: Pupils are equal, round, and reactive to light.   Cardiovascular:      Rate and Rhythm: Normal rate.   Pulmonary:      Effort: Pulmonary effort is normal. No respiratory distress.   Abdominal:      Palpations: Abdomen is soft.      Tenderness: There is no abdominal tenderness.   Musculoskeletal:      Cervical back: Normal range of motion and neck supple.   Skin:     General: Skin is warm and dry.   Neurological:      Mental Status: He is alert.           Visit Vitals  Pulse 72   Resp 16     There is no height or weight on file to calculate BMI.      Assessment/Plan   Diagnoses and all orders for this visit:    1. ADHD, predominantly  inattentive type (Primary)  -     amphetamine-dextroamphetamine XR (ADDERALL XR) 20 MG 24 hr capsule; Take 1 capsule by mouth Every Morning  Dispense: 30 capsule; Refill: 0    2. Concentration deficit    ANDREA is appropriate.  Return to the clinic in 3 month/s.  Will contact with results as needed.  This was an audio-video enabled telemedicine encounter-time spent was 12 minutes.

## 2022-01-14 ENCOUNTER — LAB (OUTPATIENT)
Dept: LAB | Facility: OTHER | Age: 27
End: 2022-01-14

## 2022-01-14 PROCEDURE — G0481 DRUG TEST DEF 8-14 CLASSES: HCPCS | Performed by: FAMILY MEDICINE

## 2022-01-14 PROCEDURE — 80307 DRUG TEST PRSMV CHEM ANLYZR: CPT | Performed by: FAMILY MEDICINE

## 2022-01-21 LAB — DRUGS UR: NORMAL

## 2022-01-31 DIAGNOSIS — F90.0 ADHD, PREDOMINANTLY INATTENTIVE TYPE: ICD-10-CM

## 2022-01-31 RX ORDER — BUSPIRONE HYDROCHLORIDE 5 MG/1
5 TABLET ORAL 3 TIMES DAILY
Qty: 90 TABLET | Refills: 1 | Status: SHIPPED | OUTPATIENT
Start: 2022-01-31 | End: 2022-03-18 | Stop reason: SDUPTHER

## 2022-01-31 RX ORDER — DEXTROAMPHETAMINE SACCHARATE, AMPHETAMINE ASPARTATE MONOHYDRATE, DEXTROAMPHETAMINE SULFATE AND AMPHETAMINE SULFATE 5; 5; 5; 5 MG/1; MG/1; MG/1; MG/1
20 CAPSULE, EXTENDED RELEASE ORAL EVERY MORNING
Qty: 30 CAPSULE | Refills: 0 | Status: SHIPPED | OUTPATIENT
Start: 2022-01-31 | End: 2022-03-07 | Stop reason: SDUPTHER

## 2022-01-31 NOTE — TELEPHONE ENCOUNTER
Incoming Refill Request      Medication requested (name and dose): busPIRone (BUSPAR) 5 MG tablet    divalproex (DEPAKOTE) 250 MG DR tablet    amphetamine-dextroamphetamine XR (ADDERALL XR) 20 MG 24 hr capsule    Pharmacy where request should be sent: [facility] [place]     Additional details provided by patient: none    Best call back number: [phone]    Does the patient have less than a 3 day supply:  [x] Yes  [] No    Amna Lundy  01/31/22, 08:36 CST

## 2022-02-02 RX ORDER — DIVALPROEX SODIUM 250 MG/1
250 TABLET, DELAYED RELEASE ORAL 3 TIMES DAILY
Qty: 90 TABLET | Refills: 0 | Status: SHIPPED | OUTPATIENT
Start: 2022-02-02 | End: 2022-03-14

## 2022-02-02 NOTE — TELEPHONE ENCOUNTER
Incoming Refill Request      Medication requested (name and dose):    divalproex (DEPAKOTE) 250 MG  tablet         Pharmacy where request should be sent: [place], [place]     Additional details provided by patient: Has been out for 3 days was called in on Monday to get filled     Best call back number: [phone]    Does the patient have less than a 3 day supply:  [x] Yes  [] No    Melissa Regan Rep  02/02/22, 11:14 CST

## 2022-03-04 DIAGNOSIS — F90.0 ADHD, PREDOMINANTLY INATTENTIVE TYPE: ICD-10-CM

## 2022-03-04 RX ORDER — DEXTROAMPHETAMINE SACCHARATE, AMPHETAMINE ASPARTATE MONOHYDRATE, DEXTROAMPHETAMINE SULFATE AND AMPHETAMINE SULFATE 5; 5; 5; 5 MG/1; MG/1; MG/1; MG/1
20 CAPSULE, EXTENDED RELEASE ORAL EVERY MORNING
Qty: 30 CAPSULE | Refills: 0 | Status: CANCELLED | OUTPATIENT
Start: 2022-03-04

## 2022-03-04 NOTE — TELEPHONE ENCOUNTER
Incoming Refill Request      Medication requested (name and dose):    amphetamine-dextroamphetamine XR (ADDERALL XR) 20 MG 24 hr capsule         Pharmacy where request should be sent: [facility] [place]    Additional details provided by patient: out     Best call back number:    amphetamine-dextroamphetamine XR (ADDERALL XR) 20 MG 24 hr capsule         Does the patient have less than a 3 day supply:  [x] Yes  [] No    Melissa Regan Rep  03/04/22, 11:11 CST

## 2022-03-07 DIAGNOSIS — F90.0 ADHD, PREDOMINANTLY INATTENTIVE TYPE: ICD-10-CM

## 2022-03-07 RX ORDER — DEXTROAMPHETAMINE SACCHARATE, AMPHETAMINE ASPARTATE MONOHYDRATE, DEXTROAMPHETAMINE SULFATE AND AMPHETAMINE SULFATE 5; 5; 5; 5 MG/1; MG/1; MG/1; MG/1
20 CAPSULE, EXTENDED RELEASE ORAL EVERY MORNING
Qty: 30 CAPSULE | Refills: 0 | Status: SHIPPED | OUTPATIENT
Start: 2022-03-07 | End: 2022-03-18 | Stop reason: SDUPTHER

## 2022-03-07 NOTE — TELEPHONE ENCOUNTER
Incoming Refill Request      Medication requested (name and dose): ADDERALL 20 MG     Pharmacy where request should be sent: [facility]     Additional details provided by patient:     Best call back number: [phone]    Does the patient have less than a 3 day supply:  [x] Yes  [] No    Melissa Wiggins Rep  03/07/22, 15:08 CST

## 2022-03-14 RX ORDER — DIVALPROEX SODIUM 250 MG/1
TABLET, DELAYED RELEASE ORAL
Qty: 90 TABLET | Refills: 0 | Status: SHIPPED | OUTPATIENT
Start: 2022-03-14 | End: 2022-03-18 | Stop reason: SDUPTHER

## 2022-03-18 ENCOUNTER — OFFICE VISIT (OUTPATIENT)
Dept: FAMILY MEDICINE CLINIC | Facility: CLINIC | Age: 27
End: 2022-03-18

## 2022-03-18 VITALS
SYSTOLIC BLOOD PRESSURE: 118 MMHG | DIASTOLIC BLOOD PRESSURE: 78 MMHG | WEIGHT: 229.06 LBS | BODY MASS INDEX: 30.36 KG/M2 | HEIGHT: 73 IN | OXYGEN SATURATION: 99 % | HEART RATE: 87 BPM

## 2022-03-18 DIAGNOSIS — F90.0 ADHD, PREDOMINANTLY INATTENTIVE TYPE: Primary | ICD-10-CM

## 2022-03-18 DIAGNOSIS — F41.1 GENERALIZED ANXIETY DISORDER: ICD-10-CM

## 2022-03-18 DIAGNOSIS — R41.840 CONCENTRATION DEFICIT: ICD-10-CM

## 2022-03-18 PROCEDURE — 99214 OFFICE O/P EST MOD 30 MIN: CPT | Performed by: FAMILY MEDICINE

## 2022-03-18 RX ORDER — DEXTROAMPHETAMINE SACCHARATE, AMPHETAMINE ASPARTATE MONOHYDRATE, DEXTROAMPHETAMINE SULFATE AND AMPHETAMINE SULFATE 5; 5; 5; 5 MG/1; MG/1; MG/1; MG/1
20 CAPSULE, EXTENDED RELEASE ORAL EVERY MORNING
Qty: 30 CAPSULE | Refills: 0 | Status: SHIPPED | OUTPATIENT
Start: 2022-03-18 | End: 2022-03-28 | Stop reason: SDUPTHER

## 2022-03-18 RX ORDER — BUSPIRONE HYDROCHLORIDE 5 MG/1
5 TABLET ORAL 3 TIMES DAILY
Qty: 90 TABLET | Refills: 1 | Status: SHIPPED | OUTPATIENT
Start: 2022-03-18 | End: 2022-06-03

## 2022-03-18 RX ORDER — DIVALPROEX SODIUM 250 MG/1
250 TABLET, DELAYED RELEASE ORAL 3 TIMES DAILY
Qty: 90 TABLET | Refills: 3 | Status: SHIPPED | OUTPATIENT
Start: 2022-03-18 | End: 2022-06-07 | Stop reason: SDUPTHER

## 2022-03-18 NOTE — PROGRESS NOTES
Ricardo De Souza is a 26 y.o. male.   Cc: follow up of chronic medical issues    Anxiety  Presents for follow-up visit. Patient reports no chest pain, compulsions, confusion, decreased concentration, depressed mood, dizziness, feeling of choking, insomnia, irritability, malaise, muscle tension, nausea, nervous/anxious behavior, obsessions or panic.         ADHD- As long as he takes the medication, He can concentrate better,Stay on Task and Complete projects.    The following portions of the patient's history were reviewed and updated as appropriate: allergies, current medications, past family history, past medical history, past social history, past surgical history and problem list.    Review of Systems   Constitutional: Negative for irritability.   Cardiovascular: Negative for chest pain.   Gastrointestinal: Negative for nausea.   Neurological: Negative for dizziness.   Psychiatric/Behavioral: Negative for confusion and decreased concentration. The patient is not nervous/anxious and does not have insomnia.        Objective   Physical Exam  Vitals reviewed.   Constitutional:       Appearance: Normal appearance.   HENT:      Head: Normocephalic and atraumatic.      Right Ear: Tympanic membrane, ear canal and external ear normal.      Left Ear: Tympanic membrane, ear canal and external ear normal.      Nose: Nose normal.      Mouth/Throat:      Mouth: Mucous membranes are moist.      Pharynx: Oropharynx is clear.   Cardiovascular:      Rate and Rhythm: Normal rate and regular rhythm.      Heart sounds: Normal heart sounds. No murmur heard.    No friction rub. No gallop.   Pulmonary:      Effort: Pulmonary effort is normal. No respiratory distress.      Breath sounds: Normal breath sounds. No stridor. No wheezing, rhonchi or rales.   Chest:      Chest wall: No tenderness.   Abdominal:      General: Abdomen is flat. Bowel sounds are normal. There is no distension.      Palpations: Abdomen is soft. There  "is no mass.      Tenderness: There is no abdominal tenderness. There is no guarding.      Hernia: No hernia is present.   Musculoskeletal:      Cervical back: Normal range of motion.   Skin:     General: Skin is warm and dry.   Neurological:      Mental Status: He is alert.           Visit Vitals  /78   Pulse 87   Ht 185.4 cm (73\")   Wt 104 kg (229 lb 1 oz)   SpO2 99%   BMI 30.22 kg/m²     Body mass index is 30.22 kg/m².      Assessment/Plan   Diagnoses and all orders for this visit:    1. ADHD, predominantly inattentive type (Primary)  -     amphetamine-dextroamphetamine XR (ADDERALL XR) 20 MG 24 hr capsule; Take 1 capsule by mouth Every Morning  Dispense: 30 capsule; Refill: 0    2. Concentration deficit    3. Generalized anxiety disorder  -     busPIRone (BUSPAR) 5 MG tablet; Take 1 tablet by mouth 3 (Three) Times a Day.  Dispense: 90 tablet; Refill: 1    Other orders  -     divalproex (DEPAKOTE) 250 MG DR tablet; Take 1 tablet by mouth 3 (Three) Times a Day.  Dispense: 90 tablet; Refill: 3    ANDREA is appropriate.  I reviewed his Toxassure and TSH and Free T-4 with the patient.  Return to the clinic in 3 month/s.  Will contact with results as needed.    "

## 2022-03-28 ENCOUNTER — TELEPHONE (OUTPATIENT)
Dept: FAMILY MEDICINE CLINIC | Facility: CLINIC | Age: 27
End: 2022-03-28

## 2022-03-28 DIAGNOSIS — F90.0 ADHD, PREDOMINANTLY INATTENTIVE TYPE: ICD-10-CM

## 2022-03-28 RX ORDER — DEXTROAMPHETAMINE SACCHARATE, AMPHETAMINE ASPARTATE MONOHYDRATE, DEXTROAMPHETAMINE SULFATE AND AMPHETAMINE SULFATE 5; 5; 5; 5 MG/1; MG/1; MG/1; MG/1
20 CAPSULE, EXTENDED RELEASE ORAL EVERY MORNING
Qty: 30 CAPSULE | Refills: 0 | Status: SHIPPED | OUTPATIENT
Start: 2022-03-28 | End: 2022-06-02 | Stop reason: SDUPTHER

## 2022-03-28 NOTE — TELEPHONE ENCOUNTER
Pt lost aderral on a trip out of [facility] and was wondering if the Dr could give approval to refill     [facility] [place], [place]

## 2022-03-29 ENCOUNTER — TELEPHONE (OUTPATIENT)
Dept: FAMILY MEDICINE CLINIC | Facility: CLINIC | Age: 27
End: 2022-03-29

## 2022-03-29 NOTE — TELEPHONE ENCOUNTER
Bambi from [facility] called needing to get an ok from Dr Collins to fill Mr Ap's adderall prescription early. Please call the pharmacy @ [phone]

## 2022-05-10 ENCOUNTER — TELEPHONE (OUTPATIENT)
Dept: FAMILY MEDICINE CLINIC | Facility: CLINIC | Age: 27
End: 2022-05-10

## 2022-05-10 NOTE — TELEPHONE ENCOUNTER
Pt starts first 3rd shift and takes adderall and needs to change routine of taking his meds for this as he hasn't worked 3rds shift before and it will change as he is doing swing shift and just wants to be sure to take when it will be most effective for work pt's wife [phone]

## 2022-06-02 DIAGNOSIS — F90.0 ADHD, PREDOMINANTLY INATTENTIVE TYPE: ICD-10-CM

## 2022-06-02 RX ORDER — DEXTROAMPHETAMINE SACCHARATE, AMPHETAMINE ASPARTATE MONOHYDRATE, DEXTROAMPHETAMINE SULFATE AND AMPHETAMINE SULFATE 5; 5; 5; 5 MG/1; MG/1; MG/1; MG/1
20 CAPSULE, EXTENDED RELEASE ORAL EVERY MORNING
Qty: 30 CAPSULE | Refills: 0 | Status: SHIPPED | OUTPATIENT
Start: 2022-06-02 | End: 2022-06-07 | Stop reason: SDUPTHER

## 2022-06-02 NOTE — TELEPHONE ENCOUNTER
Incoming Refill Request      Medication requested (name and dose):     [facility]    Pharmacy where request should be sent:     [facility] [place]    Additional details provided by patient:    Pt going out of town in AM on 06/03/22 and is requesting this get sent in today 06/02/22.    Best call back number:     [phone]    Does the patient have less than a 3 day supply:  [x] Yes  [] No    Melissa Patterson Rep  06/02/22, 09:46 CDT

## 2022-06-03 DIAGNOSIS — F41.1 GENERALIZED ANXIETY DISORDER: ICD-10-CM

## 2022-06-03 RX ORDER — BUSPIRONE HYDROCHLORIDE 5 MG/1
TABLET ORAL
Qty: 90 TABLET | Refills: 1 | Status: SHIPPED | OUTPATIENT
Start: 2022-06-03 | End: 2022-09-07 | Stop reason: SDUPTHER

## 2022-06-07 ENCOUNTER — OFFICE VISIT (OUTPATIENT)
Dept: FAMILY MEDICINE CLINIC | Facility: CLINIC | Age: 27
End: 2022-06-07

## 2022-06-07 VITALS
BODY MASS INDEX: 29.56 KG/M2 | WEIGHT: 223.06 LBS | OXYGEN SATURATION: 99 % | HEART RATE: 75 BPM | SYSTOLIC BLOOD PRESSURE: 124 MMHG | HEIGHT: 73 IN | DIASTOLIC BLOOD PRESSURE: 72 MMHG

## 2022-06-07 DIAGNOSIS — F33.1 MODERATE EPISODE OF RECURRENT MAJOR DEPRESSIVE DISORDER: ICD-10-CM

## 2022-06-07 DIAGNOSIS — F90.0 ADHD, PREDOMINANTLY INATTENTIVE TYPE: Primary | ICD-10-CM

## 2022-06-07 DIAGNOSIS — B35.3 TINEA PEDIS OF BOTH FEET: ICD-10-CM

## 2022-06-07 DIAGNOSIS — R41.840 CONCENTRATION DEFICIT: ICD-10-CM

## 2022-06-07 PROCEDURE — 99214 OFFICE O/P EST MOD 30 MIN: CPT | Performed by: FAMILY MEDICINE

## 2022-06-07 RX ORDER — DEXTROAMPHETAMINE SACCHARATE, AMPHETAMINE ASPARTATE MONOHYDRATE, DEXTROAMPHETAMINE SULFATE AND AMPHETAMINE SULFATE 5; 5; 5; 5 MG/1; MG/1; MG/1; MG/1
20 CAPSULE, EXTENDED RELEASE ORAL EVERY MORNING
Qty: 30 CAPSULE | Refills: 0 | Status: SHIPPED | OUTPATIENT
Start: 2022-06-07 | End: 2022-08-24

## 2022-06-07 RX ORDER — DIVALPROEX SODIUM 250 MG/1
250 TABLET, DELAYED RELEASE ORAL 3 TIMES DAILY
Qty: 90 TABLET | Refills: 3 | Status: SHIPPED | OUTPATIENT
Start: 2022-06-07 | End: 2022-09-07 | Stop reason: SDUPTHER

## 2022-06-07 RX ORDER — CLOTRIMAZOLE AND BETAMETHASONE DIPROPIONATE 10; .64 MG/G; MG/G
1 CREAM TOPICAL 2 TIMES DAILY
Qty: 45 G | Refills: 2 | Status: SHIPPED | OUTPATIENT
Start: 2022-06-07

## 2022-06-07 NOTE — PROGRESS NOTES
Ricardo De Souza is a 27 y.o. male.   Cc: follow up of chronic medical issues    Depression  Visit Type: follow-up  Patient is not experiencing: anhedonia, chest pain, choking sensation, compulsions, confusion, decreased concentration, depressed mood, dizziness, dry mouth, excessive worry, fatigue, feelings of hopelessness, feelings of worthlessness, hypersomnia, insomnia, memory impairment, nervousness/anxiety, obsessions, restlessness, shortness of breath, suicidal ideas, suicidal planning, thoughts of death and weight loss.      ADHD- As long as he takes his medication, he can comcentrate better, Stay on task and Complete tasks.    The following portions of the patient's history were reviewed and updated as appropriate: allergies, current medications, past family history, past medical history, past social history, past surgical history and problem list.    Review of Systems   Constitutional: Negative for weight loss.   Respiratory: Negative for choking and shortness of breath.    Psychiatric/Behavioral: Negative for confusion, decreased concentration and suicidal ideas. The patient is not nervous/anxious and does not have insomnia.        Objective   Physical Exam  Vitals reviewed.   Constitutional:       Appearance: Normal appearance.   HENT:      Head: Normocephalic and atraumatic.      Right Ear: Tympanic membrane, ear canal and external ear normal.      Left Ear: Tympanic membrane, ear canal and external ear normal.      Nose: Nose normal.      Mouth/Throat:      Mouth: Mucous membranes are moist.      Pharynx: Oropharynx is clear.   Cardiovascular:      Rate and Rhythm: Normal rate and regular rhythm.      Heart sounds: Normal heart sounds. No murmur heard.    No gallop.   Pulmonary:      Effort: Pulmonary effort is normal. No respiratory distress.      Breath sounds: Normal breath sounds. No stridor. No wheezing, rhonchi or rales.   Chest:      Chest wall: No tenderness.   Abdominal:       "General: Bowel sounds are normal. There is no distension.      Palpations: Abdomen is soft. There is no mass.      Tenderness: There is no abdominal tenderness. There is no guarding or rebound.      Hernia: No hernia is present.   Musculoskeletal:      Cervical back: Normal range of motion.   Skin:     General: Skin is warm and dry.      Comments: He has athlete's foot changes on the feet.   Neurological:      Mental Status: He is alert.           Visit Vitals  /72   Pulse 75   Ht 185.4 cm (73\")   Wt 101 kg (223 lb 1 oz)   SpO2 99%   BMI 29.43 kg/m²     Body mass index is 29.43 kg/m².      Assessment/Plan   Diagnoses and all orders for this visit:    1. ADHD, predominantly inattentive type (Primary)  -     amphetamine-dextroamphetamine XR (ADDERALL XR) 20 MG 24 hr capsule; Take 1 capsule by mouth Every Morning  Dispense: 30 capsule; Refill: 0    2. Concentration deficit    3. Moderate episode of recurrent major depressive disorder (HCC)    4. Tinea pedis of both feet    Other orders  -     divalproex (DEPAKOTE) 250 MG DR tablet; Take 1 tablet by mouth 3 (Three) Times a Day.  Dispense: 90 tablet; Refill: 3  -     clotrimazole-betamethasone (Lotrisone) 1-0.05 % cream; Apply 1 application topically to the appropriate area as directed 2 (Two) Times a Day.  Dispense: 45 g; Refill: 2    ANDREA is appropriate.  I reviewed his CBC and CMP with the patient.  Return to the clinic in 3 month/s.  Will contact with results as needed.     "

## 2022-08-24 DIAGNOSIS — F90.0 ADHD, PREDOMINANTLY INATTENTIVE TYPE: ICD-10-CM

## 2022-08-24 RX ORDER — DEXTROAMPHETAMINE SACCHARATE, AMPHETAMINE ASPARTATE MONOHYDRATE, DEXTROAMPHETAMINE SULFATE AND AMPHETAMINE SULFATE 5; 5; 5; 5 MG/1; MG/1; MG/1; MG/1
20 CAPSULE, EXTENDED RELEASE ORAL EVERY MORNING
Qty: 30 CAPSULE | Refills: 0 | Status: SHIPPED | OUTPATIENT
Start: 2022-08-24 | End: 2022-09-07 | Stop reason: SDUPTHER

## 2022-09-07 ENCOUNTER — OFFICE VISIT (OUTPATIENT)
Dept: FAMILY MEDICINE CLINIC | Facility: CLINIC | Age: 27
End: 2022-09-07

## 2022-09-07 VITALS
SYSTOLIC BLOOD PRESSURE: 126 MMHG | BODY MASS INDEX: 31.24 KG/M2 | OXYGEN SATURATION: 99 % | HEIGHT: 70 IN | WEIGHT: 218.19 LBS | HEART RATE: 79 BPM | DIASTOLIC BLOOD PRESSURE: 70 MMHG

## 2022-09-07 DIAGNOSIS — F41.1 GENERALIZED ANXIETY DISORDER: ICD-10-CM

## 2022-09-07 DIAGNOSIS — R41.840 CONCENTRATION DEFICIT: ICD-10-CM

## 2022-09-07 DIAGNOSIS — F90.0 ADHD, PREDOMINANTLY INATTENTIVE TYPE: Primary | ICD-10-CM

## 2022-09-07 PROCEDURE — 99214 OFFICE O/P EST MOD 30 MIN: CPT | Performed by: FAMILY MEDICINE

## 2022-09-07 RX ORDER — DIVALPROEX SODIUM 250 MG/1
250 TABLET, DELAYED RELEASE ORAL 3 TIMES DAILY
Qty: 90 TABLET | Refills: 3 | Status: SHIPPED | OUTPATIENT
Start: 2022-09-07

## 2022-09-07 RX ORDER — DEXTROAMPHETAMINE SACCHARATE, AMPHETAMINE ASPARTATE MONOHYDRATE, DEXTROAMPHETAMINE SULFATE AND AMPHETAMINE SULFATE 5; 5; 5; 5 MG/1; MG/1; MG/1; MG/1
20 CAPSULE, EXTENDED RELEASE ORAL EVERY MORNING
Qty: 30 CAPSULE | Refills: 0 | Status: SHIPPED | OUTPATIENT
Start: 2022-09-07 | End: 2022-11-03 | Stop reason: SDUPTHER

## 2022-09-07 RX ORDER — BUSPIRONE HYDROCHLORIDE 5 MG/1
5 TABLET ORAL 3 TIMES DAILY
Qty: 90 TABLET | Refills: 1 | Status: SHIPPED | OUTPATIENT
Start: 2022-09-07

## 2022-09-07 NOTE — PROGRESS NOTES
Ricardo De Souza is a 27 y.o. male.   Cc: follow up of chronic medical issues    Anxiety  Presents for follow-up visit. Patient reports no chest pain, compulsions, confusion, decreased concentration, depressed mood, dizziness, dry mouth, excessive worry, feeling of choking, hyperventilation, insomnia, irritability, malaise, muscle tension, nausea, nervous/anxious behavior, obsessions, palpitations, panic, restlessness, shortness of breath or suicidal ideas.         ADHD-As long as the patient takes the medication, they can concentrate better,stay on task and complete tasks.    The following portions of the patient's history were reviewed and updated as appropriate: allergies, current medications, past family history, past medical history, past social history, past surgical history and problem list.    Review of Systems   Constitutional: Negative for irritability.   Respiratory: Negative for shortness of breath.    Cardiovascular: Negative for chest pain and palpitations.   Gastrointestinal: Negative for nausea.   Neurological: Negative for dizziness.   Psychiatric/Behavioral: Negative for agitation, behavioral problems, confusion, decreased concentration, dysphoric mood and suicidal ideas. The patient is not nervous/anxious and does not have insomnia.        Objective   Physical Exam  Vitals reviewed.   HENT:      Head: Normocephalic and atraumatic.      Right Ear: Tympanic membrane, ear canal and external ear normal.      Left Ear: Tympanic membrane, ear canal and external ear normal.      Nose: Nose normal.      Mouth/Throat:      Mouth: Mucous membranes are moist.      Pharynx: Oropharynx is clear.   Cardiovascular:      Rate and Rhythm: Normal rate and regular rhythm.      Heart sounds: Normal heart sounds. No murmur heard.    No gallop.   Pulmonary:      Effort: Pulmonary effort is normal. No respiratory distress.      Breath sounds: Normal breath sounds. No stridor. No wheezing, rhonchi or  "rales.   Chest:      Chest wall: No tenderness.   Abdominal:      General: Bowel sounds are normal. There is no distension.      Palpations: Abdomen is soft. There is no mass.      Tenderness: There is no abdominal tenderness. There is no rebound.      Hernia: No hernia is present.   Musculoskeletal:      Cervical back: Normal range of motion.   Skin:     General: Skin is warm and dry.   Neurological:      Mental Status: He is alert.           Visit Vitals  /70   Pulse 79   Ht 177.8 cm (70\")   Wt 99 kg (218 lb 3 oz)   SpO2 99%   BMI 31.31 kg/m²     Body mass index is 31.31 kg/m².      Assessment/Plan   Diagnoses and all orders for this visit:    1. ADHD, predominantly inattentive type (Primary)  -     amphetamine-dextroamphetamine XR (ADDERALL XR) 20 MG 24 hr capsule; Take 1 capsule by mouth Every Morning  Dispense: 30 capsule; Refill: 0  -     Comprehensive metabolic panel; Future  -     CBC w AUTO Differential; Future  -     ToxASSURE Select 13 (MW) - Urine, Clean Catch    2. Concentration deficit  -     ToxASSURE Select 13 (MW) - Urine, Clean Catch    3. Generalized anxiety disorder  -     busPIRone (BUSPAR) 5 MG tablet; Take 1 tablet by mouth 3 (Three) Times a Day.  Dispense: 90 tablet; Refill: 1  -     Comprehensive metabolic panel; Future  -     CBC w AUTO Differential; Future    Other orders  -     divalproex (DEPAKOTE) 250 MG DR tablet; Take 1 tablet by mouth 3 (Three) Times a Day.  Dispense: 90 tablet; Refill: 3    I reviewed his last ToxAssure with the patient.  ANDREA is appropriate.  Return to the clinic in 3 month/s.  Will contact with results as needed.\  "

## 2022-11-03 DIAGNOSIS — F90.0 ADHD, PREDOMINANTLY INATTENTIVE TYPE: ICD-10-CM

## 2022-11-03 RX ORDER — DEXTROAMPHETAMINE SACCHARATE, AMPHETAMINE ASPARTATE MONOHYDRATE, DEXTROAMPHETAMINE SULFATE AND AMPHETAMINE SULFATE 5; 5; 5; 5 MG/1; MG/1; MG/1; MG/1
20 CAPSULE, EXTENDED RELEASE ORAL EVERY MORNING
Qty: 30 CAPSULE | Refills: 0 | Status: SHIPPED | OUTPATIENT
Start: 2022-11-03

## 2022-11-03 NOTE — TELEPHONE ENCOUNTER
Incoming Refill Request      Medication requested (name and dose): [facility]    Pharmacy where request should be sent: [facility]    Additional details provided by patient: pt took last dose today    Best call back number: [phone]    Does the patient have less than a 3 day supply:  [x] Yes  [] No    Melissa Serrano Rep  11/03/22, 13:06 CDT
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
## (undated) DEVICE — MONOPOLAR METZENBAUM SCISSOR TIP, DISPOSABLE: Brand: MONOPOLAR METZENBAUM SCISSOR TIP, DISPOSABLE

## (undated) DEVICE — ENDOPATH XCEL BLADELESS TROCARS WITH STABILITY SLEEVES: Brand: ENDOPATH XCEL

## (undated) DEVICE — TOTAL TRAY, 16FR 10ML SIL FOLEY, URN: Brand: MEDLINE

## (undated) DEVICE — PK LAP CHOLE LF 60

## (undated) DEVICE — SUT VIC 0/0 UR6 27IN DYED J603H

## (undated) DEVICE — GLV SURG SENSICARE GREEN W/ALOE PF LF 8 STRL

## (undated) DEVICE — STERILE POLYISOPRENE POWDER-FREE SURGICAL GLOVES WITH EMOLLIENT COATING: Brand: PROTEXIS

## (undated) DEVICE — DRP WARMR MACH

## (undated) DEVICE — SINGLE-USE BIOPSY FORCEPS: Brand: RADIAL JAW 4

## (undated) DEVICE — SKIN AFFIX SURG ADHESIVE 72/CS 0.55ML: Brand: MEDLINE

## (undated) DEVICE — BITEBLOCK ENDO W/STRAP 60F A/ LF DISP

## (undated) DEVICE — ANTIBACTERIAL UNDYED BRAIDED (POLYGLACTIN 910), SYNTHETIC ABSORBABLE SUTURE: Brand: COATED VICRYL

## (undated) DEVICE — GLV SURG TRIUMPH LT PF LTX 6.5 STRL

## (undated) DEVICE — ENDOPOUCH RETRIEVER SPECIMEN RETRIEVAL BAGS: Brand: ENDOPOUCH RETRIEVER

## (undated) DEVICE — UNDYED BRAIDED (POLYGLACTIN 910), SYNTHETIC ABSORBABLE SUTURE: Brand: COATED VICRYL

## (undated) DEVICE — GLV SURG SENSICARE GREEN W/ALOE PF LF 6.5 STRL

## (undated) DEVICE — ENDOPATH XCEL DILATING TIP TROCARS WITH STABILITY SLEEVES: Brand: ENDOPATH XCEL

## (undated) DEVICE — SUT VICRYL 0 TIES J112T

## (undated) DEVICE — GLV SURG TRIUMPH LT PF LTX 7.5 STRL

## (undated) DEVICE — CANN SMPL SOFTECH BIFLO ETCO2 A/M 7FT

## (undated) DEVICE — SOL IRRIG NACL 1000ML